# Patient Record
Sex: MALE | Race: WHITE | NOT HISPANIC OR LATINO | Employment: OTHER | URBAN - NONMETROPOLITAN AREA
[De-identification: names, ages, dates, MRNs, and addresses within clinical notes are randomized per-mention and may not be internally consistent; named-entity substitution may affect disease eponyms.]

---

## 2021-12-05 ENCOUNTER — HOSPITAL ENCOUNTER (EMERGENCY)
Facility: HOSPITAL | Age: 71
Discharge: HOME | End: 2021-12-05
Attending: EMERGENCY MEDICINE | Admitting: EMERGENCY MEDICINE

## 2021-12-05 VITALS
WEIGHT: 172 LBS | DIASTOLIC BLOOD PRESSURE: 97 MMHG | SYSTOLIC BLOOD PRESSURE: 186 MMHG | TEMPERATURE: 96.9 F | HEART RATE: 90 BPM | BODY MASS INDEX: 23.3 KG/M2 | HEIGHT: 72 IN | RESPIRATION RATE: 18 BRPM | OXYGEN SATURATION: 96 %

## 2021-12-05 DIAGNOSIS — B34.9 VIRAL ILLNESS: Primary | ICD-10-CM

## 2021-12-05 LAB
FLUAV SUBTYP SPEC NAA+PROBE: NOT DETECTED
FLUBV RNA ISLT QL NAA+PROBE: NOT DETECTED
SARS-COV-2 RNA PNL SPEC NAA+PROBE: DETECTED

## 2021-12-05 PROCEDURE — 99283 EMERGENCY DEPT VISIT LOW MDM: CPT

## 2021-12-05 PROCEDURE — C9803 HOPD COVID-19 SPEC COLLECT: HCPCS

## 2021-12-05 PROCEDURE — 87636 SARSCOV2 & INF A&B AMP PRB: CPT | Performed by: PHYSICIAN ASSISTANT

## 2021-12-05 NOTE — ED PROVIDER NOTES
Subjective   71 yo male patient presents to the ED for a COVID swab. Patient states that since last night he has had fever, HA, cough, congestion, and body aches. Was worried he has the flu or covid. Patient states that he just wanted to get a swab. No worsening or alleviating factors.  Pt has hx of HTN and hyperlipidemia.       History provided by:  Patient   used: No        Review of Systems   Constitutional: Positive for fever.   HENT: Positive for congestion.    Eyes: Negative.    Respiratory: Positive for cough.    Cardiovascular: Negative.    Gastrointestinal: Negative.    Endocrine: Negative.    Genitourinary: Negative.    Musculoskeletal: Positive for myalgias.   Skin: Negative.    Allergic/Immunologic: Negative.    Neurological: Positive for headaches.   Hematological: Negative.    Psychiatric/Behavioral: Negative.    All other systems reviewed and are negative.      Past Medical History:   Diagnosis Date   • Hyperlipidemia    • Hypertension    • Prostatic enlargement        No Known Allergies    Past Surgical History:   Procedure Laterality Date   • BACK SURGERY         No family history on file.    Social History     Socioeconomic History   • Marital status:    Tobacco Use   • Smoking status: Never Smoker   Substance and Sexual Activity   • Alcohol use: Yes     Comment: occasional   • Drug use: No           Objective   Physical Exam  Vitals and nursing note reviewed.   Constitutional:       Appearance: Normal appearance. He is normal weight.   HENT:      Head: Normocephalic and atraumatic.      Right Ear: External ear normal.      Left Ear: External ear normal.      Nose: Nose normal.      Mouth/Throat:      Mouth: Mucous membranes are moist.      Pharynx: Oropharynx is clear.   Eyes:      Extraocular Movements: Extraocular movements intact.      Conjunctiva/sclera: Conjunctivae normal.      Pupils: Pupils are equal, round, and reactive to light.   Cardiovascular:      Rate and  Rhythm: Normal rate and regular rhythm.      Pulses: Normal pulses.      Heart sounds: Normal heart sounds.   Pulmonary:      Effort: Pulmonary effort is normal.      Breath sounds: Normal breath sounds.   Abdominal:      General: Abdomen is flat. Bowel sounds are normal.      Palpations: Abdomen is soft.   Musculoskeletal:         General: Normal range of motion.      Cervical back: Normal range of motion and neck supple.   Skin:     General: Skin is warm and dry.      Capillary Refill: Capillary refill takes less than 2 seconds.   Neurological:      General: No focal deficit present.      Mental Status: He is alert and oriented to person, place, and time. Mental status is at baseline.   Psychiatric:         Mood and Affect: Mood normal.         Behavior: Behavior normal.         Thought Content: Thought content normal.         Judgment: Judgment normal.         Procedures           ED Course  ED Course as of 12/05/21 1640   Sun Dec 05, 2021   1636 PT does not wish to stay for results. States that he thought he was just going to be getting a covid swab and going home. Pt discharged. Instructions to f/u with PCP. Discussed sx that would warrant return.  [ML]      ED Course User Index  [ML] Tova Schulz PA                                                 [facility]    Final diagnoses:   Viral illness       ED Disposition  ED Disposition     ED Disposition Condition Comment    Discharge Stable           No follow-up provider specified.       Medication List      No changes were made to your prescriptions during this visit.          Tova Schulz PA  12/05/21 1640

## 2021-12-05 NOTE — DISCHARGE INSTRUCTIONS
Call one of the offices below to establish a primary care provider.  If you are unable to get an appointment and feel it is an emergency and need to be seen immediately please return to the Emergency Department.    Call one of the office below to set up a primary care provider.    Dr. Chris Longoria                                                                                                       [address] [facility] [address]  [phone]    Dr. Christian, Dr. DORINDA Laws, Dr. KIN Laws ([facility])  [address] [facility] [address]  [phone]    Dr. Wynn, Dr. Valverde, Dr. Combs ([facility])  [address] [facility] [address]  [phone]    Dr. Quiroz  [address] [facility] [address]  [phone]    Dr. Chavez, Dr. Shaw, Dr. Villegas, Dr. Dorado ([facility])  [address] [place] [address] [address]  [facility] [address]  [phone]    Dr. Blessing Olivo  [address] [facility] [place] [address]  [phone]    Dr. Adilia Delaney  [address]   [address]  [facility] [address]  [phone]    Dr. Longoria  [address] [facility] [address]  [phone]    Dr. Galeana, Dr. Pruett  [address] [facility] [place] [address]  [phone]    Dr. Aaron  [facility]                                                              [address]  [facility] [address]  [phone]    Dr. Alexander  [address] [facility] [address]  [phone]    Dr. Tanja Welsh  [address]  [address]  [place] [address]  [phone]    Dr. Santana and [facility]   [address] [facility]  [address]  [place] [address]  [phone]

## 2021-12-06 ENCOUNTER — TELEPHONE (OUTPATIENT)
Dept: EMERGENCY DEPT | Facility: HOSPITAL | Age: 71
End: 2021-12-06

## 2021-12-06 ENCOUNTER — HOSPITAL ENCOUNTER (OUTPATIENT)
Dept: INFUSION THERAPY | Facility: HOSPITAL | Age: 71
Discharge: HOME | End: 2021-12-06
Admitting: PHYSICIAN ASSISTANT

## 2021-12-06 VITALS
TEMPERATURE: 98.1 F | RESPIRATION RATE: 18 BRPM | DIASTOLIC BLOOD PRESSURE: 90 MMHG | SYSTOLIC BLOOD PRESSURE: 149 MMHG | OXYGEN SATURATION: 94 % | HEART RATE: 81 BPM

## 2021-12-06 DIAGNOSIS — U07.1 COVID-19: Primary | ICD-10-CM

## 2021-12-06 PROCEDURE — M0243 CASIRIVI AND IMDEVI INFUSION: HCPCS | Performed by: PHYSICIAN ASSISTANT

## 2021-12-06 PROCEDURE — 25010000002 INJECTION, CASIRIVIMAB AND IMDEVIMAB, 1200 MG: Performed by: PHYSICIAN ASSISTANT

## 2021-12-06 RX ORDER — EPINEPHRINE 1 MG/ML
0.3 INJECTION, SOLUTION INTRAMUSCULAR; SUBCUTANEOUS AS NEEDED
Status: CANCELLED | OUTPATIENT
Start: 2021-12-06

## 2021-12-06 RX ORDER — DIPHENHYDRAMINE HCL 50 MG
50 CAPSULE ORAL ONCE AS NEEDED
Status: CANCELLED | OUTPATIENT
Start: 2021-12-06

## 2021-12-06 RX ORDER — METHYLPREDNISOLONE SODIUM SUCCINATE 125 MG/2ML
125 INJECTION, POWDER, LYOPHILIZED, FOR SOLUTION INTRAMUSCULAR; INTRAVENOUS AS NEEDED
Status: CANCELLED | OUTPATIENT
Start: 2021-12-06

## 2021-12-06 RX ORDER — DIPHENHYDRAMINE HCL 50 MG
50 CAPSULE ORAL ONCE AS NEEDED
Status: DISCONTINUED | OUTPATIENT
Start: 2021-12-06 | End: 2021-12-08 | Stop reason: HOSPADM

## 2021-12-06 RX ORDER — DIPHENHYDRAMINE HYDROCHLORIDE 50 MG/ML
50 INJECTION INTRAMUSCULAR; INTRAVENOUS ONCE AS NEEDED
Status: CANCELLED | OUTPATIENT
Start: 2021-12-06

## 2021-12-06 RX ORDER — METHYLPREDNISOLONE SODIUM SUCCINATE 125 MG/2ML
125 INJECTION, POWDER, LYOPHILIZED, FOR SOLUTION INTRAMUSCULAR; INTRAVENOUS AS NEEDED
Status: DISCONTINUED | OUTPATIENT
Start: 2021-12-06 | End: 2021-12-08 | Stop reason: HOSPADM

## 2021-12-06 RX ORDER — DIPHENHYDRAMINE HYDROCHLORIDE 50 MG/ML
50 INJECTION INTRAMUSCULAR; INTRAVENOUS ONCE AS NEEDED
Status: DISCONTINUED | OUTPATIENT
Start: 2021-12-06 | End: 2021-12-08 | Stop reason: HOSPADM

## 2021-12-06 RX ORDER — EPINEPHRINE 1 MG/ML
0.3 INJECTION, SOLUTION INTRAMUSCULAR; SUBCUTANEOUS AS NEEDED
Status: DISCONTINUED | OUTPATIENT
Start: 2021-12-06 | End: 2021-12-08 | Stop reason: HOSPADM

## 2021-12-06 RX ADMIN — CASIRIVIMAB AND IMDEVIMAB: 600; 600 INJECTION, SOLUTION, CONCENTRATE INTRAVENOUS at 09:36

## 2021-12-06 NOTE — ED NOTES
Pt states that he just wanted to be tested for COVID 19 and called with the results. Phone # to call results at is [phone]. Provider made aware.      Karina Duke RN  12/06/21 0045

## 2022-07-30 ENCOUNTER — HOSPITAL ENCOUNTER (EMERGENCY)
Facility: HOSPITAL | Age: 72
Discharge: HOME | End: 2022-07-30
Attending: EMERGENCY MEDICINE | Admitting: EMERGENCY MEDICINE

## 2022-07-30 VITALS
HEIGHT: 72 IN | HEART RATE: 87 BPM | RESPIRATION RATE: 16 BRPM | OXYGEN SATURATION: 96 % | DIASTOLIC BLOOD PRESSURE: 86 MMHG | BODY MASS INDEX: 23.7 KG/M2 | WEIGHT: 175 LBS | SYSTOLIC BLOOD PRESSURE: 144 MMHG | TEMPERATURE: 97.7 F

## 2022-07-30 DIAGNOSIS — B34.9 VIRAL ILLNESS: Primary | ICD-10-CM

## 2022-07-30 LAB
FLUAV SUBTYP SPEC NAA+PROBE: NOT DETECTED
FLUBV RNA ISLT QL NAA+PROBE: NOT DETECTED
SARS-COV-2 RNA PNL SPEC NAA+PROBE: NOT DETECTED

## 2022-07-30 PROCEDURE — C9803 HOPD COVID-19 SPEC COLLECT: HCPCS

## 2022-07-30 PROCEDURE — 87636 SARSCOV2 & INF A&B AMP PRB: CPT | Performed by: PHYSICIAN ASSISTANT

## 2022-07-30 PROCEDURE — 99283 EMERGENCY DEPT VISIT LOW MDM: CPT

## 2023-06-15 ENCOUNTER — APPOINTMENT (OUTPATIENT)
Dept: GENERAL RADIOLOGY | Facility: HOSPITAL | Age: 73
End: 2023-06-15
Payer: OTHER GOVERNMENT

## 2023-06-15 ENCOUNTER — HOSPITAL ENCOUNTER (EMERGENCY)
Facility: HOSPITAL | Age: 73
Discharge: HOME | End: 2023-06-15
Attending: STUDENT IN AN ORGANIZED HEALTH CARE EDUCATION/TRAINING PROGRAM
Payer: OTHER GOVERNMENT

## 2023-06-15 VITALS
HEART RATE: 87 BPM | OXYGEN SATURATION: 95 % | DIASTOLIC BLOOD PRESSURE: 66 MMHG | BODY MASS INDEX: 23.7 KG/M2 | HEIGHT: 72 IN | TEMPERATURE: 97.5 F | SYSTOLIC BLOOD PRESSURE: 134 MMHG | RESPIRATION RATE: 16 BRPM | WEIGHT: 175 LBS

## 2023-06-15 DIAGNOSIS — G89.29 CHRONIC LEFT SHOULDER PAIN: Primary | ICD-10-CM

## 2023-06-15 DIAGNOSIS — M25.512 CHRONIC LEFT SHOULDER PAIN: Primary | ICD-10-CM

## 2023-06-15 LAB
ALBUMIN SERPL-MCNC: 4.2 G/DL (ref 3.5–5.2)
ALBUMIN/GLOB SERPL: 1.6 G/DL
ALP SERPL-CCNC: 99 U/L (ref 39–117)
ALT SERPL W P-5'-P-CCNC: 11 U/L (ref 1–41)
ANION GAP SERPL CALCULATED.3IONS-SCNC: 9.9 MMOL/L (ref 5–15)
AST SERPL-CCNC: 16 U/L (ref 1–40)
BASOPHILS # BLD AUTO: 0.06 10*3/MM3 (ref 0–0.2)
BASOPHILS NFR BLD AUTO: 0.8 % (ref 0–1.5)
BILIRUB SERPL-MCNC: 0.4 MG/DL (ref 0–1.2)
BILIRUB UR QL STRIP: NEGATIVE
BUN SERPL-MCNC: 16 MG/DL (ref 8–23)
BUN/CREAT SERPL: 16.3 (ref 7–25)
CALCIUM SPEC-SCNC: 9.3 MG/DL (ref 8.6–10.5)
CHLORIDE SERPL-SCNC: 103 MMOL/L (ref 98–107)
CLARITY UR: CLEAR
CO2 SERPL-SCNC: 26.1 MMOL/L (ref 22–29)
COLOR UR: YELLOW
CREAT SERPL-MCNC: 0.98 MG/DL (ref 0.76–1.27)
DEPRECATED RDW RBC AUTO: 48.1 FL (ref 37–54)
EGFRCR SERPLBLD CKD-EPI 2021: 80.9 ML/MIN/1.73
EOSINOPHIL # BLD AUTO: 0.36 10*3/MM3 (ref 0–0.4)
EOSINOPHIL NFR BLD AUTO: 5.1 % (ref 0.3–6.2)
ERYTHROCYTE [DISTWIDTH] IN BLOOD BY AUTOMATED COUNT: 13.4 % (ref 12.3–15.4)
GLOBULIN UR ELPH-MCNC: 2.6 GM/DL
GLUCOSE SERPL-MCNC: 103 MG/DL (ref 65–99)
GLUCOSE UR STRIP-MCNC: NEGATIVE MG/DL
HCT VFR BLD AUTO: 44.9 % (ref 37.5–51)
HGB BLD-MCNC: 14.6 G/DL (ref 13–17.7)
HGB UR QL STRIP.AUTO: NEGATIVE
HOLD SPECIMEN: NORMAL
HOLD SPECIMEN: NORMAL
IMM GRANULOCYTES # BLD AUTO: 0.03 10*3/MM3 (ref 0–0.05)
IMM GRANULOCYTES NFR BLD AUTO: 0.4 % (ref 0–0.5)
KETONES UR QL STRIP: ABNORMAL
LEUKOCYTE ESTERASE UR QL STRIP.AUTO: NEGATIVE
LYMPHOCYTES # BLD AUTO: 2.02 10*3/MM3 (ref 0.7–3.1)
LYMPHOCYTES NFR BLD AUTO: 28.5 % (ref 19.6–45.3)
MCH RBC QN AUTO: 31.7 PG (ref 26.6–33)
MCHC RBC AUTO-ENTMCNC: 32.5 G/DL (ref 31.5–35.7)
MCV RBC AUTO: 97.4 FL (ref 79–97)
MONOCYTES # BLD AUTO: 0.63 10*3/MM3 (ref 0.1–0.9)
MONOCYTES NFR BLD AUTO: 8.9 % (ref 5–12)
NEUTROPHILS NFR BLD AUTO: 3.99 10*3/MM3 (ref 1.7–7)
NEUTROPHILS NFR BLD AUTO: 56.3 % (ref 42.7–76)
NITRITE UR QL STRIP: NEGATIVE
NRBC BLD AUTO-RTO: 0 /100 WBC (ref 0–0.2)
PH UR STRIP.AUTO: 5.5 [PH] (ref 5–8)
PLATELET # BLD AUTO: 223 10*3/MM3 (ref 140–450)
PMV BLD AUTO: 9.8 FL (ref 6–12)
POTASSIUM SERPL-SCNC: 4.3 MMOL/L (ref 3.5–5.2)
PROT SERPL-MCNC: 6.8 G/DL (ref 6–8.5)
PROT UR QL STRIP: NEGATIVE
RBC # BLD AUTO: 4.61 10*6/MM3 (ref 4.14–5.8)
SODIUM SERPL-SCNC: 139 MMOL/L (ref 136–145)
SP GR UR STRIP: 1.02 (ref 1–1.03)
UROBILINOGEN UR QL STRIP: ABNORMAL
WBC NRBC COR # BLD: 7.09 10*3/MM3 (ref 3.4–10.8)
WHOLE BLOOD HOLD COAG: NORMAL
WHOLE BLOOD HOLD SPECIMEN: NORMAL

## 2023-06-15 PROCEDURE — 85025 COMPLETE CBC W/AUTO DIFF WBC: CPT | Performed by: PHYSICIAN ASSISTANT

## 2023-06-15 PROCEDURE — 73030 X-RAY EXAM OF SHOULDER: CPT

## 2023-06-15 PROCEDURE — 80053 COMPREHEN METABOLIC PANEL: CPT | Performed by: PHYSICIAN ASSISTANT

## 2023-06-15 PROCEDURE — 81003 URINALYSIS AUTO W/O SCOPE: CPT | Performed by: PHYSICIAN ASSISTANT

## 2023-06-15 NOTE — ED PROVIDER NOTES
Subjective   History of Present Illness  73 yo male patient with hx of HLD and HTN presents to the ED with complaints of left shoulder pain x 4 months. [facility] because he cannot drive it by himself. Pt states that they told him to come to the ED for basic labs to get a refill on his medications and shoulder xray. No new or recent injuries. No CP or SOB. No fevers.  No n/v/d. Pt reports worsening pain with movement, palpation, and stretching area.      History provided by:  Patient   used: No      Review of Systems   Constitutional: Negative.    HENT: Negative.     Eyes: Negative.    Respiratory: Negative.     Cardiovascular: Negative.    Gastrointestinal: Negative.    Endocrine: Negative.    Genitourinary: Negative.    Musculoskeletal:         +left shoulder pain    Skin: Negative.    Allergic/Immunologic: Negative.    Neurological: Negative.    Hematological: Negative.    Psychiatric/Behavioral: Negative.     All other systems reviewed and are negative.    Past Medical History:   Diagnosis Date    Hyperlipidemia     Hypertension     Prostatic enlargement        No Known Allergies    Past Surgical History:   Procedure Laterality Date    BACK SURGERY         No family history on file.    Social History     Socioeconomic History    Marital status:    Tobacco Use    Smoking status: Never   Substance and Sexual Activity    Alcohol use: Yes     Comment: occasional    Drug use: No           Objective   Physical Exam  Vitals and nursing note reviewed.   Constitutional:       Appearance: Normal appearance. He is normal weight.   HENT:      Head: Normocephalic and atraumatic.      Right Ear: External ear normal.      Left Ear: External ear normal.      Nose: Nose normal.      Mouth/Throat:      Mouth: Mucous membranes are moist.      Pharynx: Oropharynx is clear.   Eyes:      Extraocular Movements: Extraocular movements intact.       Conjunctiva/sclera: Conjunctivae normal.      Pupils: Pupils are equal, round, and reactive to light.   Cardiovascular:      Rate and Rhythm: Normal rate and regular rhythm.      Pulses: Normal pulses.      Heart sounds: Normal heart sounds.   Pulmonary:      Effort: Pulmonary effort is normal.      Breath sounds: Normal breath sounds.   Abdominal:      General: Abdomen is flat. Bowel sounds are normal.      Palpations: Abdomen is soft.   Musculoskeletal:         General: Normal range of motion.      Left shoulder: Tenderness present. Normal pulse.      Cervical back: Normal range of motion and neck supple.   Skin:     General: Skin is warm.      Capillary Refill: Capillary refill takes less than 2 seconds.   Neurological:      General: No focal deficit present.      Mental Status: He is alert and oriented to person, place, and time. Mental status is at baseline.   Psychiatric:         Mood and Affect: Mood normal.         Behavior: Behavior normal.         Thought Content: Thought content normal.         Judgment: Judgment normal.       Procedures           ED Course  ED Course as of 06/15/23 2155   Thu Umesh 15, 2023   1440 XR Shoulder 2+ View Left  MPRESSION:  1.  Chronic appearing mildly displaced fracture of the distal clavicle.  2.  Glenohumeral joint degenerative change.     This report was finalized on 6/15/2023 2:35 PM by Dr. Tiago Evans MD.           Specimen Collected: 06/15/23 14:34 EDT Last Resulted: 06/15/23 14:35 ED         [ML]      ED Course User Index  [ML] Tova Schulz PA                                           Medical Decision Making  73 yo male patient with hx of HLD and HTN presents to the ED with complaints of left shoulder pain x 4 months. [facility] because he cannot drive it by himself. Pt states that they told him to come to the ED for basic labs to get a refill on his medications and shoulder xray. No new or recent  injuries. No CP or SOB. No fevers.  No n/v/d. Pt reports worsening pain with movement, palpation, and stretching area.      Problems Addressed:  Chronic left shoulder pain: complicated acute illness or injury    Amount and/or Complexity of Data Reviewed  Labs: ordered.  Radiology: ordered. Decision-making details documented in ED Course.        Final diagnoses:   Chronic left shoulder pain       ED Disposition  ED Disposition       ED Disposition   Discharge    Condition   Stable    Comment   --               Kei Palumbo MD  [address] [place] [address] [address] [place] [address] [phone]    Schedule an appointment as soon as possible for a visit in 1 day           Medication List      No changes were made to your prescriptions during this visit.            Tova Schulz PA  06/15/23 4848

## 2023-07-18 ENCOUNTER — HOSPITAL ENCOUNTER (EMERGENCY)
Facility: HOSPITAL | Age: 73
Discharge: HOME | End: 2023-07-18
Attending: STUDENT IN AN ORGANIZED HEALTH CARE EDUCATION/TRAINING PROGRAM | Admitting: STUDENT IN AN ORGANIZED HEALTH CARE EDUCATION/TRAINING PROGRAM
Payer: OTHER GOVERNMENT

## 2023-07-18 ENCOUNTER — APPOINTMENT (OUTPATIENT)
Dept: CT IMAGING | Facility: HOSPITAL | Age: 73
End: 2023-07-18
Payer: OTHER GOVERNMENT

## 2023-07-18 VITALS
BODY MASS INDEX: 23.19 KG/M2 | OXYGEN SATURATION: 96 % | WEIGHT: 175 LBS | SYSTOLIC BLOOD PRESSURE: 160 MMHG | TEMPERATURE: 98.3 F | HEIGHT: 73 IN | DIASTOLIC BLOOD PRESSURE: 88 MMHG | RESPIRATION RATE: 18 BRPM | HEART RATE: 78 BPM

## 2023-07-18 DIAGNOSIS — R19.5 DARK STOOLS: ICD-10-CM

## 2023-07-18 DIAGNOSIS — K57.92 DIVERTICULITIS: Primary | ICD-10-CM

## 2023-07-18 DIAGNOSIS — N40.0 ENLARGED PROSTATE: ICD-10-CM

## 2023-07-18 LAB
ALBUMIN SERPL-MCNC: 4.6 G/DL (ref 3.5–5.2)
ALBUMIN/GLOB SERPL: 1.5 G/DL
ALP SERPL-CCNC: 98 U/L (ref 39–117)
ALT SERPL W P-5'-P-CCNC: 13 U/L (ref 1–41)
ANION GAP SERPL CALCULATED.3IONS-SCNC: 13.7 MMOL/L (ref 5–15)
APTT PPP: 32.3 SECONDS (ref 26.5–34.5)
AST SERPL-CCNC: 21 U/L (ref 1–40)
BASOPHILS # BLD AUTO: 0.08 10*3/MM3 (ref 0–0.2)
BASOPHILS NFR BLD AUTO: 1 % (ref 0–1.5)
BILIRUB SERPL-MCNC: 0.9 MG/DL (ref 0–1.2)
BILIRUB UR QL STRIP: ABNORMAL
BUN SERPL-MCNC: 20 MG/DL (ref 8–23)
BUN/CREAT SERPL: 23.5 (ref 7–25)
CALCIUM SPEC-SCNC: 9.7 MG/DL (ref 8.6–10.5)
CHLORIDE SERPL-SCNC: 101 MMOL/L (ref 98–107)
CLARITY UR: ABNORMAL
CO2 SERPL-SCNC: 24.3 MMOL/L (ref 22–29)
COLOR UR: ABNORMAL
CREAT SERPL-MCNC: 0.85 MG/DL (ref 0.76–1.27)
DEPRECATED RDW RBC AUTO: 46 FL (ref 37–54)
EGFRCR SERPLBLD CKD-EPI 2021: 91.2 ML/MIN/1.73
EOSINOPHIL # BLD AUTO: 0.67 10*3/MM3 (ref 0–0.4)
EOSINOPHIL NFR BLD AUTO: 8.6 % (ref 0.3–6.2)
ERYTHROCYTE [DISTWIDTH] IN BLOOD BY AUTOMATED COUNT: 13.2 % (ref 12.3–15.4)
GLOBULIN UR ELPH-MCNC: 3 GM/DL
GLUCOSE SERPL-MCNC: 110 MG/DL (ref 65–99)
GLUCOSE UR STRIP-MCNC: NEGATIVE MG/DL
HCT VFR BLD AUTO: 47.2 % (ref 37.5–51)
HGB BLD-MCNC: 15.5 G/DL (ref 13–17.7)
HGB UR QL STRIP.AUTO: NEGATIVE
IMM GRANULOCYTES # BLD AUTO: 0.02 10*3/MM3 (ref 0–0.05)
IMM GRANULOCYTES NFR BLD AUTO: 0.3 % (ref 0–0.5)
INR PPP: 1.06 (ref 0.9–1.1)
KETONES UR QL STRIP: ABNORMAL
LEUKOCYTE ESTERASE UR QL STRIP.AUTO: NEGATIVE
LYMPHOCYTES # BLD AUTO: 1.57 10*3/MM3 (ref 0.7–3.1)
LYMPHOCYTES NFR BLD AUTO: 20.1 % (ref 19.6–45.3)
MCH RBC QN AUTO: 31.2 PG (ref 26.6–33)
MCHC RBC AUTO-ENTMCNC: 32.8 G/DL (ref 31.5–35.7)
MCV RBC AUTO: 95 FL (ref 79–97)
MONOCYTES # BLD AUTO: 0.71 10*3/MM3 (ref 0.1–0.9)
MONOCYTES NFR BLD AUTO: 9.1 % (ref 5–12)
NEUTROPHILS NFR BLD AUTO: 4.78 10*3/MM3 (ref 1.7–7)
NEUTROPHILS NFR BLD AUTO: 60.9 % (ref 42.7–76)
NITRITE UR QL STRIP: NEGATIVE
NRBC BLD AUTO-RTO: 0 /100 WBC (ref 0–0.2)
PH UR STRIP.AUTO: 5.5 [PH] (ref 5–8)
PLATELET # BLD AUTO: 223 10*3/MM3 (ref 140–450)
PMV BLD AUTO: 9.7 FL (ref 6–12)
POTASSIUM SERPL-SCNC: 4 MMOL/L (ref 3.5–5.2)
PROT SERPL-MCNC: 7.6 G/DL (ref 6–8.5)
PROT UR QL STRIP: ABNORMAL
PROTHROMBIN TIME: 14.4 SECONDS (ref 12.1–14.7)
RBC # BLD AUTO: 4.97 10*6/MM3 (ref 4.14–5.8)
SODIUM SERPL-SCNC: 139 MMOL/L (ref 136–145)
SP GR UR STRIP: 1.02 (ref 1–1.03)
UROBILINOGEN UR QL STRIP: ABNORMAL
WBC NRBC COR # BLD: 7.83 10*3/MM3 (ref 3.4–10.8)

## 2023-07-18 PROCEDURE — 74177 CT ABD & PELVIS W/CONTRAST: CPT | Performed by: RADIOLOGY

## 2023-07-18 PROCEDURE — 80053 COMPREHEN METABOLIC PANEL: CPT | Performed by: STUDENT IN AN ORGANIZED HEALTH CARE EDUCATION/TRAINING PROGRAM

## 2023-07-18 PROCEDURE — 36415 COLL VENOUS BLD VENIPUNCTURE: CPT

## 2023-07-18 PROCEDURE — 81003 URINALYSIS AUTO W/O SCOPE: CPT | Performed by: STUDENT IN AN ORGANIZED HEALTH CARE EDUCATION/TRAINING PROGRAM

## 2023-07-18 PROCEDURE — 96374 THER/PROPH/DIAG INJ IV PUSH: CPT

## 2023-07-18 PROCEDURE — 85730 THROMBOPLASTIN TIME PARTIAL: CPT | Performed by: STUDENT IN AN ORGANIZED HEALTH CARE EDUCATION/TRAINING PROGRAM

## 2023-07-18 PROCEDURE — 85025 COMPLETE CBC W/AUTO DIFF WBC: CPT | Performed by: STUDENT IN AN ORGANIZED HEALTH CARE EDUCATION/TRAINING PROGRAM

## 2023-07-18 PROCEDURE — 25510000001 IOPAMIDOL 61 % SOLUTION: Performed by: STUDENT IN AN ORGANIZED HEALTH CARE EDUCATION/TRAINING PROGRAM

## 2023-07-18 PROCEDURE — 85610 PROTHROMBIN TIME: CPT | Performed by: STUDENT IN AN ORGANIZED HEALTH CARE EDUCATION/TRAINING PROGRAM

## 2023-07-18 PROCEDURE — 99283 EMERGENCY DEPT VISIT LOW MDM: CPT

## 2023-07-18 PROCEDURE — 74177 CT ABD & PELVIS W/CONTRAST: CPT

## 2023-07-18 RX ORDER — SODIUM CHLORIDE 0.9 % (FLUSH) 0.9 %
10 SYRINGE (ML) INJECTION AS NEEDED
Status: DISCONTINUED | OUTPATIENT
Start: 2023-07-18 | End: 2023-07-18 | Stop reason: HOSPADM

## 2023-07-18 RX ORDER — FAMOTIDINE 10 MG/ML
20 INJECTION, SOLUTION INTRAVENOUS ONCE
Status: COMPLETED | OUTPATIENT
Start: 2023-07-18 | End: 2023-07-18

## 2023-07-18 RX ORDER — AMOXICILLIN AND CLAVULANATE POTASSIUM 875; 125 MG/1; MG/1
1 TABLET, FILM COATED ORAL 2 TIMES DAILY
Qty: 20 TABLET | Refills: 0 | Status: SHIPPED | OUTPATIENT
Start: 2023-07-18 | End: 2023-07-28

## 2023-07-18 RX ADMIN — IOPAMIDOL 100 ML: 612 INJECTION, SOLUTION INTRAVENOUS at 08:59

## 2023-07-18 RX ADMIN — FAMOTIDINE 20 MG: 10 INJECTION, SOLUTION INTRAVENOUS at 07:59

## 2023-07-18 NOTE — ED PROVIDER NOTES
Subjective   History of Present Illness  72-year-old male presents to the ER with complaints of black stools.  Patient's symptoms have been present for the last couple days.  Patient has been consuming Kaopectate secondary to a diarrhea illness.  Patient also verbalizes he is having some urinary incontinence.  Patient's history is positive for prostate issues in which [facility].  Patient notes his last colonoscopy was about 5 years ago.  Patient has been on any type of anticoagulation or blood thinners.      Review of Systems   Constitutional: Negative.  Negative for fever.   HENT: Negative.     Respiratory: Negative.     Cardiovascular: Negative.  Negative for chest pain.   Gastrointestinal:  Positive for blood in stool. Negative for abdominal pain.   Endocrine: Negative.    Genitourinary:  Positive for difficulty urinating. Negative for dysuria.   Skin: Negative.    Neurological: Negative.    Psychiatric/Behavioral: Negative.     All other systems reviewed and are negative.    Past Medical History:   Diagnosis Date    Hyperlipidemia     Hypertension     Prostatic enlargement        No Known Allergies    Past Surgical History:   Procedure Laterality Date    BACK SURGERY         No family history on file.    Social History     Socioeconomic History    Marital status:    Tobacco Use    Smoking status: Never   Substance and Sexual Activity    Alcohol use: Yes     Comment: occasional    Drug use: No           Objective   Physical Exam  Vitals and nursing note reviewed.   Constitutional:       General: He is not in acute distress.     Appearance: He is well-developed. He is not diaphoretic.   HENT:      Head: Normocephalic and atraumatic.      Right Ear: External ear normal.      Left Ear: External ear normal.      Nose: Nose normal.   Eyes:      Conjunctiva/sclera: Conjunctivae normal.   Neck:      Vascular: No JVD.      Trachea: No tracheal deviation.   Cardiovascular:      Rate and Rhythm:  Normal rate.      Heart sounds: No murmur heard.  Pulmonary:      Effort: Pulmonary effort is normal. No respiratory distress.      Breath sounds: No wheezing.   Abdominal:      Palpations: Abdomen is soft.      Tenderness: There is abdominal tenderness.   Musculoskeletal:         General: No deformity. Normal range of motion.      Cervical back: Normal range of motion and neck supple.   Skin:     General: Skin is warm and dry.      Coloration: Skin is not pale.      Findings: No erythema or rash.   Neurological:      Mental Status: He is alert and oriented to person, place, and time.      Cranial Nerves: No cranial nerve deficit.   Psychiatric:         Behavior: Behavior normal.         Thought Content: Thought content normal.       Procedures           ED Course  ED Course as of 07/24/23 0650   Tue Jul 18, 2023   1006 Patient care was taken over from midlevel provider at shift change.  Patient had reported some dark stools without bright red blood.  Hemoglobin is stable at 15.5 hematocrit 47.2.  CT imaging shows thickening sigmoid with some inflammatory changes consistent with diverticulitis.    Patient does admit to tenderness on the left lower quadrant region which are consistent with CT findings.  Patient is agreeable to be treated with oral antibiotics.  He was encouraged to utilize MiraLAX for several days to help with stool burden and if he continues to have voiding difficulties to follow-up with urology.  Recommended that patient also follow-up with general surgery for colonoscopy once he completes antibiotic therapy.  Patient verbalizes understanding and return precautions discussed  Electronically signed by Suzette Paula DO, 07/18/23, 10:11 AM EDT.'   [LK]      ED Course User Index  [LK] Suzette Paula DO                                           Medical Decision Making  72-year-old male with chief complaint of black or bloody stools.  Patient also verbalizes that he is having difficulty  urinating.    Problems Addressed:  Dark stools: acute illness or injury  Diverticulitis: acute illness or injury     Details: Work-up is positive for diverticulitis.  Patient was endorsed to Dr. Paula at shift change to Dr. Paula was able to discharge this patient.  Patient treated appropriately for diverticulitis.  Outpatient follow-up for colonoscopy is recommended.  Enlarged prostate: acute illness or injury    Amount and/or Complexity of Data Reviewed  Labs: ordered.  Radiology: ordered. Decision-making details documented in ED Course.    Risk  Prescription drug management.        Final diagnoses:   Diverticulitis   Dark stools   Enlarged prostate       ED Disposition  ED Disposition       ED Disposition   Discharge    Condition   Stable    Comment   --               Shay Muir MD  [address] [facility]  [address]  [place] [address]  [phone]      Please call urology office to schedule follow-up appointment due to difficulty in voiding to be assessed for BPH.  CT imaging is consistent with enlarged prostate.    Felix Okeefe MD  [address] [facility]  [address]  [place] [address]  [phone]    Schedule an appointment as soon as possible for a visit   Call the office to schedule an appointment to have colonoscopy         Medication List        New Prescriptions      amoxicillin-clavulanate 875-125 MG per tablet  Commonly known as: AUGMENTIN  Take 1 tablet by mouth 2 (Two) Times a Day for 10 days. Diverticulitis               Where to Get Your Medications        These medications were sent to [id number] - [place] - [address] AT [facility] OF [address] - [phone] PH - [phone] FX  [address] [place] [address]      Phone: [phone]   amoxicillin-clavulanate 875-125 MG per tablet            Sven Duran II, PA  07/24/23 0061

## 2023-07-20 ENCOUNTER — TRANSCRIBE ORDERS (OUTPATIENT)
Dept: ADMINISTRATIVE | Facility: HOSPITAL | Age: 73
End: 2023-07-20
Payer: MEDICARE

## 2023-07-20 DIAGNOSIS — S49.92XA INJURY OF UPPER ARM, LEFT, INITIAL ENCOUNTER: Primary | ICD-10-CM

## 2023-07-25 ENCOUNTER — HOSPITAL ENCOUNTER (EMERGENCY)
Facility: HOSPITAL | Age: 73
Discharge: HOME | End: 2023-07-25
Attending: EMERGENCY MEDICINE | Admitting: EMERGENCY MEDICINE
Payer: OTHER GOVERNMENT

## 2023-07-25 VITALS
DIASTOLIC BLOOD PRESSURE: 92 MMHG | OXYGEN SATURATION: 97 % | WEIGHT: 175 LBS | BODY MASS INDEX: 23.7 KG/M2 | RESPIRATION RATE: 18 BRPM | TEMPERATURE: 97.6 F | HEART RATE: 81 BPM | HEIGHT: 72 IN | SYSTOLIC BLOOD PRESSURE: 137 MMHG

## 2023-07-25 DIAGNOSIS — K59.1 FUNCTIONAL DIARRHEA: Primary | ICD-10-CM

## 2023-07-25 LAB
ALBUMIN SERPL-MCNC: 5.1 G/DL (ref 3.5–5.2)
ALBUMIN/GLOB SERPL: 1.5 G/DL
ALP SERPL-CCNC: 104 U/L (ref 39–117)
ALT SERPL W P-5'-P-CCNC: 13 U/L (ref 1–41)
AMPHET+METHAMPHET UR QL: NEGATIVE
AMPHETAMINES UR QL: NEGATIVE
ANION GAP SERPL CALCULATED.3IONS-SCNC: 11.3 MMOL/L (ref 5–15)
AST SERPL-CCNC: 21 U/L (ref 1–40)
BARBITURATES UR QL SCN: NEGATIVE
BASOPHILS # BLD AUTO: 0.09 10*3/MM3 (ref 0–0.2)
BASOPHILS NFR BLD AUTO: 1 % (ref 0–1.5)
BENZODIAZ UR QL SCN: POSITIVE
BILIRUB SERPL-MCNC: 0.4 MG/DL (ref 0–1.2)
BILIRUB UR QL STRIP: NEGATIVE
BUN SERPL-MCNC: 17 MG/DL (ref 8–23)
BUN/CREAT SERPL: 17.3 (ref 7–25)
BUPRENORPHINE SERPL-MCNC: NEGATIVE NG/ML
CALCIUM SPEC-SCNC: 9.9 MG/DL (ref 8.6–10.5)
CANNABINOIDS SERPL QL: NEGATIVE
CHLORIDE SERPL-SCNC: 99 MMOL/L (ref 98–107)
CLARITY UR: CLEAR
CO2 SERPL-SCNC: 28.7 MMOL/L (ref 22–29)
COCAINE UR QL: NEGATIVE
COLOR UR: YELLOW
CREAT SERPL-MCNC: 0.98 MG/DL (ref 0.76–1.27)
D-LACTATE SERPL-SCNC: 1.5 MMOL/L (ref 0.5–2)
DEPRECATED RDW RBC AUTO: 47.4 FL (ref 37–54)
EGFRCR SERPLBLD CKD-EPI 2021: 80.9 ML/MIN/1.73
EOSINOPHIL # BLD AUTO: 0.56 10*3/MM3 (ref 0–0.4)
EOSINOPHIL NFR BLD AUTO: 6 % (ref 0.3–6.2)
ERYTHROCYTE [DISTWIDTH] IN BLOOD BY AUTOMATED COUNT: 13.3 % (ref 12.3–15.4)
FENTANYL UR-MCNC: NEGATIVE NG/ML
GLOBULIN UR ELPH-MCNC: 3.3 GM/DL
GLUCOSE SERPL-MCNC: 97 MG/DL (ref 65–99)
GLUCOSE UR STRIP-MCNC: NEGATIVE MG/DL
HCT VFR BLD AUTO: 51.2 % (ref 37.5–51)
HGB BLD-MCNC: 16.7 G/DL (ref 13–17.7)
HGB UR QL STRIP.AUTO: NEGATIVE
HOLD SPECIMEN: NORMAL
HOLD SPECIMEN: NORMAL
IMM GRANULOCYTES # BLD AUTO: 0.03 10*3/MM3 (ref 0–0.05)
IMM GRANULOCYTES NFR BLD AUTO: 0.3 % (ref 0–0.5)
KETONES UR QL STRIP: ABNORMAL
LEUKOCYTE ESTERASE UR QL STRIP.AUTO: NEGATIVE
LIPASE SERPL-CCNC: 34 U/L (ref 13–60)
LYMPHOCYTES # BLD AUTO: 2.77 10*3/MM3 (ref 0.7–3.1)
LYMPHOCYTES NFR BLD AUTO: 29.6 % (ref 19.6–45.3)
MCH RBC QN AUTO: 31.5 PG (ref 26.6–33)
MCHC RBC AUTO-ENTMCNC: 32.6 G/DL (ref 31.5–35.7)
MCV RBC AUTO: 96.6 FL (ref 79–97)
METHADONE UR QL SCN: NEGATIVE
MONOCYTES # BLD AUTO: 0.69 10*3/MM3 (ref 0.1–0.9)
MONOCYTES NFR BLD AUTO: 7.4 % (ref 5–12)
NEUTROPHILS NFR BLD AUTO: 5.21 10*3/MM3 (ref 1.7–7)
NEUTROPHILS NFR BLD AUTO: 55.7 % (ref 42.7–76)
NITRITE UR QL STRIP: NEGATIVE
NRBC BLD AUTO-RTO: 0 /100 WBC (ref 0–0.2)
OPIATES UR QL: POSITIVE
OXYCODONE UR QL SCN: NEGATIVE
PCP UR QL SCN: NEGATIVE
PH UR STRIP.AUTO: 5.5 [PH] (ref 5–8)
PLATELET # BLD AUTO: 310 10*3/MM3 (ref 140–450)
PMV BLD AUTO: 9.7 FL (ref 6–12)
POTASSIUM SERPL-SCNC: 4 MMOL/L (ref 3.5–5.2)
PROPOXYPH UR QL: NEGATIVE
PROT SERPL-MCNC: 8.4 G/DL (ref 6–8.5)
PROT UR QL STRIP: NEGATIVE
RBC # BLD AUTO: 5.3 10*6/MM3 (ref 4.14–5.8)
SODIUM SERPL-SCNC: 139 MMOL/L (ref 136–145)
SP GR UR STRIP: >1.03 (ref 1–1.03)
TRICYCLICS UR QL SCN: NEGATIVE
UROBILINOGEN UR QL STRIP: ABNORMAL
WBC NRBC COR # BLD: 9.35 10*3/MM3 (ref 3.4–10.8)
WHOLE BLOOD HOLD COAG: NORMAL
WHOLE BLOOD HOLD SPECIMEN: NORMAL

## 2023-07-25 PROCEDURE — 36415 COLL VENOUS BLD VENIPUNCTURE: CPT

## 2023-07-25 PROCEDURE — 85025 COMPLETE CBC W/AUTO DIFF WBC: CPT | Performed by: STUDENT IN AN ORGANIZED HEALTH CARE EDUCATION/TRAINING PROGRAM

## 2023-07-25 PROCEDURE — 80307 DRUG TEST PRSMV CHEM ANLYZR: CPT | Performed by: STUDENT IN AN ORGANIZED HEALTH CARE EDUCATION/TRAINING PROGRAM

## 2023-07-25 PROCEDURE — 99283 EMERGENCY DEPT VISIT LOW MDM: CPT

## 2023-07-25 PROCEDURE — 83690 ASSAY OF LIPASE: CPT | Performed by: STUDENT IN AN ORGANIZED HEALTH CARE EDUCATION/TRAINING PROGRAM

## 2023-07-25 PROCEDURE — 80053 COMPREHEN METABOLIC PANEL: CPT | Performed by: STUDENT IN AN ORGANIZED HEALTH CARE EDUCATION/TRAINING PROGRAM

## 2023-07-25 PROCEDURE — 81003 URINALYSIS AUTO W/O SCOPE: CPT | Performed by: STUDENT IN AN ORGANIZED HEALTH CARE EDUCATION/TRAINING PROGRAM

## 2023-07-25 PROCEDURE — 83605 ASSAY OF LACTIC ACID: CPT | Performed by: STUDENT IN AN ORGANIZED HEALTH CARE EDUCATION/TRAINING PROGRAM

## 2023-07-25 RX ORDER — DIPHENOXYLATE HYDROCHLORIDE AND ATROPINE SULFATE 2.5; .025 MG/1; MG/1
1 TABLET ORAL ONCE
Status: COMPLETED | OUTPATIENT
Start: 2023-07-25 | End: 2023-07-25

## 2023-07-25 RX ORDER — DIPHENOXYLATE HYDROCHLORIDE AND ATROPINE SULFATE 2.5; .025 MG/1; MG/1
1 TABLET ORAL 4 TIMES DAILY PRN
Qty: 6 TABLET | Refills: 0 | Status: SHIPPED | OUTPATIENT
Start: 2023-07-25

## 2023-07-25 RX ORDER — SODIUM CHLORIDE 0.9 % (FLUSH) 0.9 %
10 SYRINGE (ML) INJECTION AS NEEDED
Status: DISCONTINUED | OUTPATIENT
Start: 2023-07-25 | End: 2023-07-25 | Stop reason: HOSPADM

## 2023-07-25 RX ADMIN — DIPHENOXYLATE HYDROCHLORIDE AND ATROPINE SULFATE 1 TABLET: 2.5; .025 TABLET ORAL at 16:52

## 2023-07-25 NOTE — ED PROVIDER NOTES
Subjective   History of Present Illness  Patient presents to ER with diarrhea    Diarrhea  The primary symptoms include nausea and diarrhea.   The illness is also significant for chills and anorexia.     Review of Systems   Constitutional:  Positive for chills.   HENT: Negative.     Eyes: Negative.    Respiratory: Negative.     Gastrointestinal:  Positive for anorexia, diarrhea and nausea.   Endocrine: Negative.    Genitourinary:  Positive for frequency and urgency.   Musculoskeletal: Negative.    Neurological: Negative.    Hematological: Negative.    Psychiatric/Behavioral: Negative.       Past Medical History:   Diagnosis Date    Hyperlipidemia     Hypertension     Prostatic enlargement        No Known Allergies    Past Surgical History:   Procedure Laterality Date    BACK SURGERY         No family history on file.    Social History     Socioeconomic History    Marital status:    Tobacco Use    Smoking status: Never   Substance and Sexual Activity    Alcohol use: Yes     Comment: occasional    Drug use: No           Objective   Physical Exam  Vitals and nursing note reviewed.   HENT:      Head: Normocephalic.      Nose: Nose normal.      Mouth/Throat:      Mouth: Mucous membranes are moist.   Eyes:      Pupils: Pupils are equal, round, and reactive to light.   Cardiovascular:      Rate and Rhythm: Normal rate.      Pulses: Normal pulses.   Pulmonary:      Effort: Pulmonary effort is normal.   Abdominal:      General: Abdomen is flat.   Musculoskeletal:         General: Normal range of motion.      Cervical back: Normal range of motion.   Skin:     General: Skin is warm.      Capillary Refill: Capillary refill takes less than 2 seconds.   Neurological:      General: No focal deficit present.      Mental Status: He is alert.   Psychiatric:         Mood and Affect: Mood normal.       Procedures           ED Course                                           Medical Decision Making  Amount and/or Complexity of  Data Reviewed  Labs: ordered.    Risk  Prescription drug management.        Final diagnoses:   Functional diarrhea       ED Disposition  ED Disposition       ED Disposition   Discharge    Condition   Stable    Comment   --               Raquel Guo S, APRN  [address]  [facility] [address] [phone]    Schedule an appointment as soon as possible for a visit   If symptoms worsen         Medication List        New Prescriptions      diphenoxylate-atropine 2.5-0.025 MG per tablet  Commonly known as: LOMOTIL  Take 1 tablet by mouth 4 (Four) Times a Day As Needed for Diarrhea.               Where to Get Your Medications        These medications were sent to [id number] - [place], [place] - [address] AT [facility] [address] - [phone]  - [phone]   [address] [place] [address]      Phone: [phone]   diphenoxylate-atropine 2.5-0.025 MG per tablet            Jose Dooley MD  07/25/23 8623

## 2023-07-25 NOTE — ED NOTES
MEDICAL SCREENING:    Reason for Visit: Diarrhea, increased urinary difficulty, history of BPH    Patient initially seen in triage.  The patient was advised further evaluation and diagnostic testing will be needed, some of the treatment and testing will be initiated in the lobby in order to begin the process.  The patient will be returned to the waiting area for the time being and possibly be re-assessed by a subsequent ED provider.  The patient will be brought back to the treatment area in as timely manner as possible.       Eriberto Almaguer, DO  07/25/23 1450

## 2023-08-02 ENCOUNTER — OFFICE VISIT (OUTPATIENT)
Dept: UROLOGY | Facility: CLINIC | Age: 73
End: 2023-08-02
Payer: MEDICARE

## 2023-08-02 VITALS
BODY MASS INDEX: 23.03 KG/M2 | DIASTOLIC BLOOD PRESSURE: 65 MMHG | HEART RATE: 86 BPM | SYSTOLIC BLOOD PRESSURE: 113 MMHG | WEIGHT: 170 LBS | HEIGHT: 72 IN

## 2023-08-02 DIAGNOSIS — N40.0 ENLARGED PROSTATE: Primary | ICD-10-CM

## 2023-08-02 DIAGNOSIS — R35.0 BENIGN PROSTATIC HYPERPLASIA WITH URINARY FREQUENCY: ICD-10-CM

## 2023-08-02 DIAGNOSIS — N40.1 BENIGN PROSTATIC HYPERPLASIA WITH URINARY FREQUENCY: ICD-10-CM

## 2023-08-02 LAB
BILIRUB BLD-MCNC: NEGATIVE MG/DL
CLARITY, POC: CLEAR
COLOR UR: YELLOW
EXPIRATION DATE: NORMAL
GLUCOSE UR STRIP-MCNC: NEGATIVE MG/DL
KETONES UR QL: NEGATIVE
LEUKOCYTE EST, POC: NEGATIVE
Lab: NORMAL
NITRITE UR-MCNC: NEGATIVE MG/ML
PH UR: 6 [PH] (ref 5–8)
PROT UR STRIP-MCNC: NEGATIVE MG/DL
RBC # UR STRIP: NEGATIVE /UL
SP GR UR: 1.03 (ref 1–1.03)
UROBILINOGEN UR QL: NORMAL

## 2023-08-02 RX ORDER — FINASTERIDE 5 MG/1
5 TABLET, FILM COATED ORAL DAILY
Qty: 30 TABLET | Refills: 5 | Status: SHIPPED | OUTPATIENT
Start: 2023-08-02

## 2023-10-27 ENCOUNTER — OFFICE VISIT (OUTPATIENT)
Dept: UROLOGY | Facility: CLINIC | Age: 73
End: 2023-10-27
Payer: MEDICARE

## 2023-10-27 VITALS
SYSTOLIC BLOOD PRESSURE: 136 MMHG | HEART RATE: 93 BPM | DIASTOLIC BLOOD PRESSURE: 81 MMHG | WEIGHT: 165.6 LBS | BODY MASS INDEX: 22.43 KG/M2 | HEIGHT: 72 IN

## 2023-10-27 DIAGNOSIS — R35.0 FREQUENCY OF MICTURITION: Primary | ICD-10-CM

## 2023-10-27 DIAGNOSIS — N40.0 ENLARGED PROSTATE: ICD-10-CM

## 2023-10-27 DIAGNOSIS — R35.0 BENIGN PROSTATIC HYPERPLASIA WITH URINARY FREQUENCY: ICD-10-CM

## 2023-10-27 DIAGNOSIS — N40.1 BENIGN PROSTATIC HYPERPLASIA WITH URINARY FREQUENCY: ICD-10-CM

## 2023-10-27 DIAGNOSIS — R97.20 ELEVATED PROSTATE SPECIFIC ANTIGEN (PSA): ICD-10-CM

## 2023-10-27 LAB
BILIRUB BLD-MCNC: NEGATIVE MG/DL
CLARITY, POC: CLEAR
COLOR UR: YELLOW
EXPIRATION DATE: ABNORMAL
GLUCOSE UR STRIP-MCNC: NEGATIVE MG/DL
KETONES UR QL: NEGATIVE
LEUKOCYTE EST, POC: NEGATIVE
Lab: ABNORMAL
NITRITE UR-MCNC: NEGATIVE MG/ML
PH UR: 5 [PH] (ref 5–8)
PROT UR STRIP-MCNC: ABNORMAL MG/DL
RBC # UR STRIP: NEGATIVE /UL
SP GR UR: 1.02 (ref 1–1.03)
UROBILINOGEN UR QL: NORMAL

## 2023-10-27 PROCEDURE — 84153 ASSAY OF PSA TOTAL: CPT

## 2023-10-27 PROCEDURE — 84154 ASSAY OF PSA FREE: CPT

## 2023-10-27 NOTE — PROGRESS NOTES
"Chief Complaint:    Chief Complaint   Patient presents with    Elevated PSA       Vital Signs:   /81 (BP Location: Left arm, Patient Position: Sitting, Cuff Size: Adult)   Pulse 93   Ht 182.9 cm (72.01\")   Wt 75.1 kg (165 lb 9.6 oz)   BMI 22.45 kg/m²   Body mass index is 22.45 kg/m².      HPI:  Tip Palacios is a 72 y.o. male who presents today for follow up    History of Present Illness  Mr. Watts presents to the clinic for reevaluation of elevated PSA.  He has been referred to us by SETH Lal.  Patient does have a past medical history of elevated PSA and underwent a prostate biopsy that came back negative.  He was last seen in office by me and August of this year and was started on finasteride due to lower urinary tract symptoms.  He also currently takes Flomax nightly.  He still reports getting up roughly every 2 hours throughout the night.  He endorses frequency, urgency, weak urinary stream, intermittency, and straining to begin with urination.  [facility] that came back relatively high at 15.  They repeated a PSA on 9/22/2023 that came back slightly lower at roughly 10.  Digital rectal exam his prostate is enlarged with no tenderness to palpation.  There is significant hardness on the right side versus left.      Past Medical History:  Past Medical History:   Diagnosis Date    Hyperlipidemia     Hypertension     Prostatic enlargement        Current Meds:  Current Outpatient Medications   Medication Sig Dispense Refill    diphenoxylate-atropine (LOMOTIL) 2.5-0.025 MG per tablet Take 1 tablet by mouth 4 (Four) Times a Day As Needed for Diarrhea. 6 tablet 0    finasteride (PROSCAR) 5 MG tablet Take 1 tablet by mouth Daily. 30 tablet 5    gabapentin (NEURONTIN) 300 MG capsule Take 1 capsule by mouth 2 (Two) Times a Day.      HYDROcodone-acetaminophen (NORCO) 5-325 MG per tablet Take 1 tablet by mouth Every 8 (Eight) Hours As Needed for Severe Pain. 9 " tablet 0    pravastatin (PRAVACHOL) 40 MG tablet Take 1 tablet by mouth Daily.      tamsulosin (FLOMAX) 0.4 MG capsule 24 hr capsule Take 1 capsule by mouth Every Night.      traZODone (DESYREL) 50 MG tablet Take 1 tablet by mouth Every Night.      Mirabegron ER (MYRBETRIQ) 25 MG tablet sustained-release 24 hour 24 hr tablet Take 1 tablet by mouth Daily. 30 tablet 0     No current facility-administered medications for this visit.        Allergies:   No Known Allergies     Past Surgical History:  Past Surgical History:   Procedure Laterality Date    BACK SURGERY         Social History:  Social History     Socioeconomic History    Marital status:    Tobacco Use    Smoking status: Never    Smokeless tobacco: Never   Vaping Use    Vaping Use: Never used   Substance and Sexual Activity    Alcohol use: Yes     Comment: occasional    Drug use: No    Sexual activity: Never       Family History:  History reviewed. No pertinent family history.    Review of Systems:  Review of Systems   Constitutional:  Negative for chills, fatigue, fever and unexpected weight change.   Respiratory:  Negative for cough, chest tightness, shortness of breath and wheezing.    Cardiovascular:  Negative for chest pain and leg swelling.   Gastrointestinal:  Positive for diarrhea. Negative for abdominal pain, constipation, nausea and vomiting.   Genitourinary:  Positive for difficulty urinating, frequency and urgency. Negative for dysuria, flank pain, scrotal swelling and testicular pain.   Musculoskeletal:  Negative for back pain and joint swelling.   Skin:  Negative for rash.   Neurological:  Negative for dizziness and headaches.   Psychiatric/Behavioral:  Negative for confusion and suicidal ideas.        Physical Exam:  Physical Exam  Constitutional:       General: He is not in acute distress.     Appearance: Normal appearance.   HENT:      Head: Normocephalic and atraumatic.      Nose: Nose normal.      Mouth/Throat:      Mouth: Mucous  membranes are moist.   Eyes:      Conjunctiva/sclera: Conjunctivae normal.   Cardiovascular:      Rate and Rhythm: Normal rate and regular rhythm.      Pulses: Normal pulses.      Heart sounds: Normal heart sounds.   Pulmonary:      Effort: Pulmonary effort is normal.      Breath sounds: Normal breath sounds.   Abdominal:      General: Bowel sounds are normal.      Palpations: Abdomen is soft.   Genitourinary:     Comments: Enlarged prostate with significant hardness on right versus left.  Musculoskeletal:         General: Normal range of motion.      Cervical back: Normal range of motion.   Skin:     General: Skin is warm.   Neurological:      General: No focal deficit present.      Mental Status: He is alert and oriented to person, place, and time.   Psychiatric:         Mood and Affect: Mood normal.         Behavior: Behavior normal.         Thought Content: Thought content normal.         Judgment: Judgment normal.           Recent Image (CT and/or KUB):   CT Abdomen and Pelvis: No results found for this or any previous visit.     CT Stone Protocol: No results found for this or any previous visit.     KUB: No results found for this or any previous visit.       Labs:  Brief Urine Lab Results  (Last result in the past 365 days)        Color   Clarity   Blood   Leuk Est   Nitrite   Protein   CREAT   Urine HCG        10/27/23 1503 Yellow   Clear   Negative   Negative   Negative   Trace                 Office Visit on 10/27/2023   Component Date Value Ref Range Status    Color 10/27/2023 Yellow  Yellow, Straw, Dark Yellow, Sandra Final    Clarity, UA 10/27/2023 Clear  Clear Final    Specific Gravity  10/27/2023 1.025  1.005 - 1.030 Final    pH, Urine 10/27/2023 5.0  5.0 - 8.0 Final    Leukocytes 10/27/2023 Negative  Negative Final    Nitrite, UA 10/27/2023 Negative  Negative Final    Protein, POC 10/27/2023 Trace (A)  Negative mg/dL Final    Glucose, UA 10/27/2023 Negative  Negative mg/dL Final    Ketones, UA  10/27/2023 Negative  Negative Final    Urobilinogen, UA 10/27/2023 Normal  Normal, 0.2 E.U./dL Final    Bilirubin 10/27/2023 Negative  Negative Final    Blood, UA 10/27/2023 Negative  Negative Final    Lot Number 10/27/2023 [id number]   Final    Expiration Date 10/27/2023 10/5/24   Final   Office Visit on 08/02/2023   Component Date Value Ref Range Status    Color 08/02/2023 Yellow  Yellow, Straw, Dark Yellow, Sandra Final    Clarity, UA 08/02/2023 Clear  Clear Final    Specific Gravity  08/02/2023 1.030  1.005 - 1.030 Final    pH, Urine 08/02/2023 6.0  5.0 - 8.0 Final    Leukocytes 08/02/2023 Negative  Negative Final    Nitrite, UA 08/02/2023 Negative  Negative Final    Protein, POC 08/02/2023 Negative  Negative mg/dL Final    Glucose, UA 08/02/2023 Negative  Negative mg/dL Final    Ketones, UA 08/02/2023 Negative  Negative Final    Urobilinogen, UA 08/02/2023 Normal  Normal, 0.2 E.U./dL Final    Bilirubin 08/02/2023 Negative  Negative Final    Blood, UA 08/02/2023 Negative  Negative Final    Lot Number 08/02/2023 [id number]   Final    Expiration Date 08/02/2023 102,024   Final        Procedure: None  Procedures     I have reviewed and agree with the above PMH, PSH, FMH, social history, medications, allergies, and labs.     Assessment/Plan:   Problem List Items Addressed This Visit          Genitourinary and Reproductive     Enlarged prostate    Relevant Medications    Mirabegron ER (MYRBETRIQ) 25 MG tablet sustained-release 24 hour 24 hr tablet    Other Relevant Orders    PSA Total+% Free (Serial)    Benign prostatic hyperplasia with urinary frequency    Relevant Medications    Mirabegron ER (MYRBETRIQ) 25 MG tablet sustained-release 24 hour 24 hr tablet     Other Visit Diagnoses       Frequency of micturition    -  Primary    Relevant Orders    POC Urinalysis Dipstick, Automated (Completed)    Elevated prostate specific antigen (PSA)        Relevant Orders    PSA Total+% Free (Serial)            Health  Maintenance:   Health Maintenance Due   Topic Date Due    COLORECTAL CANCER SCREENING  Never done    COVID-19 Vaccine (1) Never done    TDAP/TD VACCINES (1 - Tdap) Never done    ZOSTER VACCINE (1 of 2) Never done    Pneumococcal Vaccine 65+ (1 - PCV) Never done    HEPATITIS C SCREENING  Never done    ANNUAL WELLNESS VISIT  Never done    LIPID PANEL  Never done    INFLUENZA VACCINE  Never done        Smoking Counseling: Never smoked or used smokeless tobacco    Urine Incontinence: Patient reports that he is not currently experiencing any symptoms of urinary incontinence.    Patient was given instructions and counseling regarding his condition or for health maintenance advice. Please see specific information pulled into the AVS if appropriate.    Patient Education:   Elevated PSA/prostatic enlargement - Discussed the pathophysiology of prostatic enlargement and obstruction.  We discussed the static and dynamic effects of prostatic enlargement as well as using 5 alpha reductase inhibitors versus alpha blockade.  We discussed the indications for transurethral surgery as well and/ or other therapeutic options available including all of the newer techniques.  Patient has been on Flomax and finasteride with minimal improvement in symptoms.  We will start him on trial of Myrbetriq 25 mg once nightly.  Discussed the risk and benefits of this medication.  Advised patient to discontinue Myrbetriq if he begins to experience an increase in difficulty urinating, decreased urinary output, concerns of urinary retention.  Patient verbalized understanding.  PSA testing -given patient's history of elevated PSA and recommended repeat PSA free and total.  I discussed the pathophysiology of PSA testing indicating its use in the diagnosis and management of prostate cancer.  I discussed the normal range being 0 to 4, but more appropriately being much closer to 0 to 2 in a normal male.  I discussed the fact that after a certain age it is  against recommendation to use PSA testing especially in view of numerous comorbidities.  I discussed many of the things that can artificially raise PSA including put not limited to a recent infection, urinary tract infection, recent sexual intercourse, or even the type of movement such as manipulation of the prostate from riding a bicycle.  It was discussed that the most important use of PSA is the velocity measurement.  This refers to the change of PSA with time. I discussed that we look for greater than 20% rise over a year to help us make the prediction of prostate cancer.  I also discussed that in the case of prostate cancer indicating a radical prostatectomy, the PSA should be 0 and any rise indicates an early biochemical recurrence.  I will call patient with PSA results once obtained.  Given digital rectal exam showing firmness to palpation on the right versus left side with some enlargement and recommending to schedule him for a biopsy as soon as possible.  I will call patient with PSA results on Monday with further discussion of biopsy at that time.  Patient verbalized understanding.    Visit Diagnoses:    ICD-10-CM ICD-9-CM   1. Frequency of micturition  R35.0 788.41   2. Enlarged prostate  N40.0 600.00   3. Elevated prostate specific antigen (PSA)  R97.20 790.93   4. Benign prostatic hyperplasia with urinary frequency  N40.1 600.01    R35.0 788.41       Meds Ordered During Visit:  New Medications Ordered This Visit   Medications    Mirabegron ER (MYRBETRIQ) 25 MG tablet sustained-release 24 hour 24 hr tablet     Sig: Take 1 tablet by mouth Daily.     Dispense:  30 tablet     Refill:  0       Follow Up Appointment: Biopsy  No follow-ups on file.      This document has been electronically signed by Yohannes Mcgowan PA-C   October 27, 2023 15:29 EDT    Part of this note may be an electronic transcription/translation of spoken language to printed text using the Dragon Dictation System.

## 2023-10-30 ENCOUNTER — TELEPHONE (OUTPATIENT)
Dept: UROLOGY | Facility: CLINIC | Age: 73
End: 2023-10-30
Payer: MEDICARE

## 2023-10-30 DIAGNOSIS — R97.20 ELEVATED PROSTATE SPECIFIC ANTIGEN (PSA): Primary | ICD-10-CM

## 2023-10-30 LAB
PSA FREE MFR SERPL: 12.1 %
PSA FREE SERPL-MCNC: 1.69 NG/ML
PSA SERPL-MCNC: 14 NG/ML (ref 0–4)

## 2023-10-30 RX ORDER — CLINDAMYCIN HYDROCHLORIDE 300 MG/1
300 CAPSULE ORAL 2 TIMES DAILY
Qty: 6 CAPSULE | Refills: 0 | Status: SHIPPED | OUTPATIENT
Start: 2023-10-30 | End: 2023-11-02

## 2023-10-30 RX ORDER — DIAZEPAM 10 MG/1
TABLET ORAL
Qty: 2 TABLET | Refills: 0 | Status: SHIPPED | OUTPATIENT
Start: 2023-10-30

## 2023-10-30 RX ORDER — CIPROFLOXACIN 500 MG/1
TABLET, FILM COATED ORAL
Qty: 4 TABLET | Refills: 0 | Status: SHIPPED | OUTPATIENT
Start: 2023-10-30

## 2023-10-30 NOTE — TELEPHONE ENCOUNTER
Called Mr. Monday to discuss PSA results.  Vies him it did come back significantly high at 14 with a 12% free percentage.  Advised him given digital rectal exam we should continue forward with a prostate biopsy.  I will send in appropriate medications and he should keep his follow-up on 21 December.  Patient verbalized understanding.

## 2023-11-13 ENCOUNTER — NURSE TRIAGE (OUTPATIENT)
Dept: CALL CENTER | Facility: HOSPITAL | Age: 73
End: 2023-11-13
Payer: MEDICARE

## 2023-11-13 ENCOUNTER — HOSPITAL ENCOUNTER (EMERGENCY)
Facility: HOSPITAL | Age: 73
Discharge: HOME | End: 2023-11-14
Attending: EMERGENCY MEDICINE
Payer: OTHER GOVERNMENT

## 2023-11-13 ENCOUNTER — APPOINTMENT (OUTPATIENT)
Dept: GENERAL RADIOLOGY | Facility: HOSPITAL | Age: 73
End: 2023-11-13
Payer: OTHER GOVERNMENT

## 2023-11-13 DIAGNOSIS — R40.4 TRANSIENT ALTERATION OF AWARENESS: ICD-10-CM

## 2023-11-13 DIAGNOSIS — I10 PRIMARY HYPERTENSION: ICD-10-CM

## 2023-11-13 DIAGNOSIS — F12.188 CANNABIS HYPEREMESIS SYNDROME CONCURRENT WITH AND DUE TO CANNABIS ABUSE: Primary | ICD-10-CM

## 2023-11-13 LAB
ALBUMIN SERPL-MCNC: 5.3 G/DL (ref 3.5–5.2)
ALBUMIN/GLOB SERPL: 1.7 G/DL
ALP SERPL-CCNC: 117 U/L (ref 39–117)
ALT SERPL W P-5'-P-CCNC: 13 U/L (ref 1–41)
ANION GAP SERPL CALCULATED.3IONS-SCNC: 24 MMOL/L (ref 5–15)
AST SERPL-CCNC: 23 U/L (ref 1–40)
BASOPHILS # BLD AUTO: 0.03 10*3/MM3 (ref 0–0.2)
BASOPHILS NFR BLD AUTO: 0.2 % (ref 0–1.5)
BILIRUB SERPL-MCNC: 0.7 MG/DL (ref 0–1.2)
BUN SERPL-MCNC: 15 MG/DL (ref 8–23)
BUN/CREAT SERPL: 14.4 (ref 7–25)
CALCIUM SPEC-SCNC: 10.6 MG/DL (ref 8.6–10.5)
CHLORIDE SERPL-SCNC: 96 MMOL/L (ref 98–107)
CO2 SERPL-SCNC: 18 MMOL/L (ref 22–29)
CREAT SERPL-MCNC: 1.04 MG/DL (ref 0.76–1.27)
DEPRECATED RDW RBC AUTO: 45 FL (ref 37–54)
EGFRCR SERPLBLD CKD-EPI 2021: 75.3 ML/MIN/1.73
EOSINOPHIL # BLD AUTO: 0 10*3/MM3 (ref 0–0.4)
EOSINOPHIL NFR BLD AUTO: 0 % (ref 0.3–6.2)
ERYTHROCYTE [DISTWIDTH] IN BLOOD BY AUTOMATED COUNT: 13.2 % (ref 12.3–15.4)
FLUAV RNA RESP QL NAA+PROBE: NOT DETECTED
FLUBV RNA RESP QL NAA+PROBE: NOT DETECTED
GLOBULIN UR ELPH-MCNC: 3.1 GM/DL
GLUCOSE SERPL-MCNC: 179 MG/DL (ref 65–99)
HCT VFR BLD AUTO: 59.1 % (ref 37.5–51)
HGB BLD-MCNC: 19.9 G/DL (ref 13–17.7)
HOLD SPECIMEN: NORMAL
HOLD SPECIMEN: NORMAL
IMM GRANULOCYTES # BLD AUTO: 0.05 10*3/MM3 (ref 0–0.05)
IMM GRANULOCYTES NFR BLD AUTO: 0.3 % (ref 0–0.5)
LYMPHOCYTES # BLD AUTO: 0.83 10*3/MM3 (ref 0.7–3.1)
LYMPHOCYTES NFR BLD AUTO: 4.9 % (ref 19.6–45.3)
MCH RBC QN AUTO: 31.4 PG (ref 26.6–33)
MCHC RBC AUTO-ENTMCNC: 33.7 G/DL (ref 31.5–35.7)
MCV RBC AUTO: 93.4 FL (ref 79–97)
MONOCYTES # BLD AUTO: 0.97 10*3/MM3 (ref 0.1–0.9)
MONOCYTES NFR BLD AUTO: 5.7 % (ref 5–12)
NEUTROPHILS NFR BLD AUTO: 15.08 10*3/MM3 (ref 1.7–7)
NEUTROPHILS NFR BLD AUTO: 88.9 % (ref 42.7–76)
NRBC BLD AUTO-RTO: 0 /100 WBC (ref 0–0.2)
PLATELET # BLD AUTO: 311 10*3/MM3 (ref 140–450)
PMV BLD AUTO: 9.7 FL (ref 6–12)
POTASSIUM SERPL-SCNC: 4.4 MMOL/L (ref 3.5–5.2)
PROT SERPL-MCNC: 8.4 G/DL (ref 6–8.5)
RBC # BLD AUTO: 6.33 10*6/MM3 (ref 4.14–5.8)
SARS-COV-2 RNA RESP QL NAA+PROBE: NOT DETECTED
SODIUM SERPL-SCNC: 138 MMOL/L (ref 136–145)
TROPONIN T SERPL HS-MCNC: 46 NG/L
WBC NRBC COR # BLD: 16.96 10*3/MM3 (ref 3.4–10.8)
WHOLE BLOOD HOLD COAG: NORMAL
WHOLE BLOOD HOLD SPECIMEN: NORMAL

## 2023-11-13 PROCEDURE — 84484 ASSAY OF TROPONIN QUANT: CPT | Performed by: EMERGENCY MEDICINE

## 2023-11-13 PROCEDURE — 85025 COMPLETE CBC W/AUTO DIFF WBC: CPT | Performed by: EMERGENCY MEDICINE

## 2023-11-13 PROCEDURE — 87636 SARSCOV2 & INF A&B AMP PRB: CPT | Performed by: EMERGENCY MEDICINE

## 2023-11-13 PROCEDURE — 93005 ELECTROCARDIOGRAM TRACING: CPT | Performed by: EMERGENCY MEDICINE

## 2023-11-13 PROCEDURE — 71045 X-RAY EXAM CHEST 1 VIEW: CPT

## 2023-11-13 PROCEDURE — 36415 COLL VENOUS BLD VENIPUNCTURE: CPT

## 2023-11-13 PROCEDURE — 80053 COMPREHEN METABOLIC PANEL: CPT | Performed by: EMERGENCY MEDICINE

## 2023-11-13 PROCEDURE — 99285 EMERGENCY DEPT VISIT HI MDM: CPT

## 2023-11-13 PROCEDURE — 71045 X-RAY EXAM CHEST 1 VIEW: CPT | Performed by: RADIOLOGY

## 2023-11-13 RX ORDER — SODIUM CHLORIDE 0.9 % (FLUSH) 0.9 %
10 SYRINGE (ML) INJECTION AS NEEDED
Status: DISCONTINUED | OUTPATIENT
Start: 2023-11-13 | End: 2023-11-14 | Stop reason: HOSPADM

## 2023-11-13 NOTE — TELEPHONE ENCOUNTER
"Reason for Disposition  • [1] MODERATE vomiting (e.g., 3 - 5 times/day) AND [2] age > 60 years    Additional Information  • Negative: Shock suspected (e.g., cold/pale/clammy skin, too weak to stand, low BP, rapid pulse)  • Negative: Difficult to awaken or acting confused (e.g., disoriented, slurred speech)  • Negative: Sounds like a life-threatening emergency to the triager  • Negative: Vomiting occurs only while coughing  • Negative: [1] Pregnant < 20 Weeks AND [2] nausea/vomiting began in early pregnancy (i.e., 4-8 weeks pregnant)  • Negative: Chest pain  • Negative: Headache is main symptom  • Negative: Vomiting (or Nausea) in a cancer patient who is currently (or recently) receiving chemotherapy or radiation therapy, or cancer patient who has metastatic or end-stage cancer and is receiving palliative care  • Negative: [1] Vomiting AND [2] contains red blood or black (\"coffee ground\") material  (Exception: Few red streaks in vomit that only happened once.)  • Negative: Severe pain in one eye  • Negative: Recent head injury (within last 3 days)  • Negative: Recent abdominal injury (within last 3 days)  • Negative: [1] Insulin-dependent diabetes (Type I) AND [2] glucose > 400 mg/dl (22 mmol/l)  • Negative: [1] Vomiting AND [2] hernia is more painful or swollen than usual  • Negative: [1] SEVERE vomiting (e.g., 6 or more times/day) AND [2] present > 8 hours (Exception: Patient sounds well, is drinking liquids, does not sound dehydrated, and vomiting has lasted less than 24 hours.)    Answer Assessment - Initial Assessment Questions  1. VOMITING SEVERITY: \"How many times have you vomited in the past 24 hours?\"      - MILD:  1 - 2 times/day     - MODERATE: 3 - 5 times/day, decreased oral intake without significant weight loss or symptoms of dehydration     - SEVERE: 6 or more times/day, vomits everything or nearly everything, with significant weight loss, symptoms of dehydration       Mod to severe  2. ONSET: \"When " "did the vomiting begin?\"       This morning  3. FLUIDS: \"What fluids or food have you vomited up today?\" \"Have you been able to keep any fluids down?\"      nothing  4. ABDOMEN PAIN: \"Are your having any abdomen pain?\" If Yes : \"How bad is it and what does it feel like?\" (e.g., crampy, dull, intermittent, constant)       Mild  5. DIARRHEA: \"Is there any diarrhea?\" If Yes, ask: \"How many times today?\"       no  6. CONTACTS: \"Is there anyone else in the family with the same symptoms?\"       no  7. CAUSE: \"What do you think is causing your vomiting?\"      Not sure  8. HYDRATION STATUS: \"Any signs of dehydration?\" (e.g., dry mouth [not only dry lips], too weak to stand) \"When did you last urinate?\"      no  9. OTHER SYMPTOMS: \"Do you have any other symptoms?\" (e.g., fever, headache, vertigo, vomiting blood or coffee grounds, recent head injury)      HTN  10. PREGNANCY: \"Is there any chance you are pregnant?\" \"When was your last menstrual period?\"        na    Protocols used: Vomiting-ADULT-AH    "

## 2023-11-14 ENCOUNTER — APPOINTMENT (OUTPATIENT)
Dept: CT IMAGING | Facility: HOSPITAL | Age: 73
End: 2023-11-14
Payer: OTHER GOVERNMENT

## 2023-11-14 VITALS
HEIGHT: 72 IN | WEIGHT: 165 LBS | DIASTOLIC BLOOD PRESSURE: 78 MMHG | SYSTOLIC BLOOD PRESSURE: 150 MMHG | OXYGEN SATURATION: 96 % | BODY MASS INDEX: 22.35 KG/M2 | TEMPERATURE: 98.2 F | HEART RATE: 96 BPM | RESPIRATION RATE: 20 BRPM

## 2023-11-14 LAB
A-A DO2: 21.8 MMHG (ref 0–300)
AMPHET+METHAMPHET UR QL: NEGATIVE
AMPHETAMINES UR QL: NEGATIVE
ARTERIAL PATENCY WRIST A: ABNORMAL
ATMOSPHERIC PRESS: 735 MMHG
BACTERIA UR QL AUTO: ABNORMAL /HPF
BARBITURATES UR QL SCN: NEGATIVE
BASE EXCESS BLDA CALC-SCNC: 3.7 MMOL/L (ref 0–2)
BDY SITE: ABNORMAL
BENZODIAZ UR QL SCN: NEGATIVE
BILIRUB UR QL STRIP: NEGATIVE
BUPRENORPHINE SERPL-MCNC: NEGATIVE NG/ML
CANNABINOIDS SERPL QL: POSITIVE
CLARITY UR: ABNORMAL
CO2 BLDA-SCNC: 24.3 MMOL/L (ref 22–33)
COCAINE UR QL: NEGATIVE
COHGB MFR BLD: 0.4 % (ref 0–5)
COLOR UR: YELLOW
FENTANYL UR-MCNC: NEGATIVE NG/ML
GEN 5 2HR TROPONIN T REFLEX: 44 NG/L
GLUCOSE UR STRIP-MCNC: ABNORMAL MG/DL
HCO3 BLDA-SCNC: 23.5 MMOL/L (ref 20–26)
HCT VFR BLD CALC: 60 % (ref 38–51)
HGB BLDA-MCNC: 19.6 G/DL (ref 14–18)
HGB UR QL STRIP.AUTO: ABNORMAL
HYALINE CASTS UR QL AUTO: ABNORMAL /LPF
INHALED O2 CONCENTRATION: 21 %
KETONES UR QL STRIP: ABNORMAL
LEUKOCYTE ESTERASE UR QL STRIP.AUTO: NEGATIVE
Lab: ABNORMAL
Lab: ABNORMAL
METHADONE UR QL SCN: NEGATIVE
METHGB BLD QL: <-0.1 % (ref 0–3)
MODALITY: ABNORMAL
NITRITE UR QL STRIP: NEGATIVE
NOTE: ABNORMAL
NOTIFIED BY: ABNORMAL
NOTIFIED WHO: ABNORMAL
OPIATES UR QL: NEGATIVE
OXYCODONE UR QL SCN: NEGATIVE
OXYHGB MFR BLDV: 97.9 % (ref 94–99)
PCO2 BLDA: 25 MM HG (ref 35–45)
PCO2 TEMP ADJ BLD: ABNORMAL MM[HG]
PCP UR QL SCN: NEGATIVE
PH BLDA: 7.58 PH UNITS (ref 7.35–7.45)
PH UR STRIP.AUTO: 5.5 [PH] (ref 5–8)
PH, TEMP CORRECTED: ABNORMAL
PO2 BLDA: 94.5 MM HG (ref 83–108)
PO2 TEMP ADJ BLD: ABNORMAL MM[HG]
PROT UR QL STRIP: ABNORMAL
QT INTERVAL: 346 MS
QTC INTERVAL: 465 MS
RBC # UR STRIP: ABNORMAL /HPF
REF LAB TEST METHOD: ABNORMAL
SAO2 % BLDCOA: 97.8 % (ref 94–99)
SP GR UR STRIP: >=1.03 (ref 1–1.03)
SQUAMOUS #/AREA URNS HPF: ABNORMAL /HPF
TRICYCLICS UR QL SCN: NEGATIVE
TROPONIN T DELTA: -2 NG/L
UROBILINOGEN UR QL STRIP: ABNORMAL
VENTILATOR MODE: ABNORMAL
WBC # UR STRIP: ABNORMAL /HPF

## 2023-11-14 PROCEDURE — 25010000002 ONDANSETRON PER 1 MG: Performed by: EMERGENCY MEDICINE

## 2023-11-14 PROCEDURE — 82375 ASSAY CARBOXYHB QUANT: CPT

## 2023-11-14 PROCEDURE — 25510000001 IOPAMIDOL 61 % SOLUTION: Performed by: EMERGENCY MEDICINE

## 2023-11-14 PROCEDURE — 25010000002 HYDRALAZINE PER 20 MG: Performed by: EMERGENCY MEDICINE

## 2023-11-14 PROCEDURE — 70450 CT HEAD/BRAIN W/O DYE: CPT | Performed by: RADIOLOGY

## 2023-11-14 PROCEDURE — 96375 TX/PRO/DX INJ NEW DRUG ADDON: CPT

## 2023-11-14 PROCEDURE — 81001 URINALYSIS AUTO W/SCOPE: CPT | Performed by: EMERGENCY MEDICINE

## 2023-11-14 PROCEDURE — 36600 WITHDRAWAL OF ARTERIAL BLOOD: CPT

## 2023-11-14 PROCEDURE — 25010000002 HALOPERIDOL LACTATE PER 5 MG: Performed by: EMERGENCY MEDICINE

## 2023-11-14 PROCEDURE — 25810000003 SODIUM CHLORIDE 0.9 % SOLUTION: Performed by: EMERGENCY MEDICINE

## 2023-11-14 PROCEDURE — 83050 HGB METHEMOGLOBIN QUAN: CPT

## 2023-11-14 PROCEDURE — 96374 THER/PROPH/DIAG INJ IV PUSH: CPT

## 2023-11-14 PROCEDURE — 84484 ASSAY OF TROPONIN QUANT: CPT | Performed by: EMERGENCY MEDICINE

## 2023-11-14 PROCEDURE — 74178 CT ABD&PLV WO CNTR FLWD CNTR: CPT

## 2023-11-14 PROCEDURE — 82805 BLOOD GASES W/O2 SATURATION: CPT

## 2023-11-14 PROCEDURE — 70450 CT HEAD/BRAIN W/O DYE: CPT

## 2023-11-14 PROCEDURE — 80307 DRUG TEST PRSMV CHEM ANLYZR: CPT | Performed by: EMERGENCY MEDICINE

## 2023-11-14 PROCEDURE — 74178 CT ABD&PLV WO CNTR FLWD CNTR: CPT | Performed by: RADIOLOGY

## 2023-11-14 RX ORDER — HALOPERIDOL 5 MG/ML
2 INJECTION INTRAMUSCULAR ONCE
Status: COMPLETED | OUTPATIENT
Start: 2023-11-14 | End: 2023-11-14

## 2023-11-14 RX ORDER — ONDANSETRON 2 MG/ML
4 INJECTION INTRAMUSCULAR; INTRAVENOUS ONCE
Status: COMPLETED | OUTPATIENT
Start: 2023-11-14 | End: 2023-11-14

## 2023-11-14 RX ORDER — HYDRALAZINE HYDROCHLORIDE 20 MG/ML
10 INJECTION INTRAMUSCULAR; INTRAVENOUS ONCE
Status: COMPLETED | OUTPATIENT
Start: 2023-11-14 | End: 2023-11-14

## 2023-11-14 RX ORDER — ONDANSETRON 2 MG/ML
4 INJECTION INTRAMUSCULAR; INTRAVENOUS ONCE
Status: DISCONTINUED | OUTPATIENT
Start: 2023-11-14 | End: 2023-11-14

## 2023-11-14 RX ADMIN — ONDANSETRON 4 MG: 2 INJECTION INTRAMUSCULAR; INTRAVENOUS at 05:28

## 2023-11-14 RX ADMIN — HYDRALAZINE HYDROCHLORIDE 10 MG: 20 INJECTION INTRAMUSCULAR; INTRAVENOUS at 01:42

## 2023-11-14 RX ADMIN — HALOPERIDOL LACTATE 2 MG: 5 INJECTION, SOLUTION INTRAMUSCULAR at 05:28

## 2023-11-14 RX ADMIN — IOPAMIDOL 70 ML: 612 INJECTION, SOLUTION INTRAVENOUS at 06:03

## 2023-11-14 RX ADMIN — SODIUM CHLORIDE 1000 ML: 9 INJECTION, SOLUTION INTRAVENOUS at 05:29

## 2023-11-14 NOTE — ED NOTES
Pt educated on the need for a urine sample. Pt provided with a urinal at this time. Pt stated he was unable to provide a sample at this time.

## 2023-11-14 NOTE — ED PROVIDER NOTES
"Subjective   History of Present Illness  Tip Palacios is a 72-year-old male sent to the emergency room by his wife who was concerned with his \"hands turning blue\" and the fact that his blood pressure was high, 200/115.    When I examined the patient in the emergency room he gave me a different story, stating that he \"had COVID 3 days ago\" and that he still felt nauseated and had occasional episodes of vomiting.  He denies any abdominal pain, fever, shortness of breath or cough.  Denies any recent travel, denies any recent exposure to sick contacts.        Review of Systems   Constitutional:  Negative for appetite change, chills, diaphoresis, fatigue and fever.        Concerned with blood pressure being high   HENT:  Negative for congestion, nosebleeds, rhinorrhea, sneezing and sore throat.    Eyes:  Negative for photophobia and discharge.   Respiratory:  Negative for cough, shortness of breath, wheezing and stridor.    Cardiovascular:  Negative for chest pain.   Gastrointestinal:  Positive for nausea and vomiting. Negative for abdominal pain, anal bleeding and diarrhea.   Endocrine: Negative for cold intolerance and heat intolerance.   Genitourinary:  Negative for decreased urine volume, difficulty urinating and dysuria.   Skin:  Negative for rash and wound.   Neurological:  Negative for dizziness, facial asymmetry, light-headedness and headaches.   Psychiatric/Behavioral:  Negative for agitation, self-injury, sleep disturbance and suicidal ideas.    All other systems reviewed and are negative.      Past Medical History:   Diagnosis Date    Hyperlipidemia     Hypertension     Prostatic enlargement        No Known Allergies    Past Surgical History:   Procedure Laterality Date    BACK SURGERY         No family history on file.    Social History     Socioeconomic History    Marital status:    Tobacco Use    Smoking status: Never    Smokeless tobacco: Never   Vaping Use    Vaping Use: Never used   Substance and " Sexual Activity    Alcohol use: Yes     Comment: occasional    Drug use: No    Sexual activity: Never           Objective   Physical Exam  Vitals and nursing note reviewed.   Constitutional:       General: He is not in acute distress.     Appearance: Normal appearance. He is normal weight. He is not ill-appearing, toxic-appearing or diaphoretic.   HENT:      Head: Normocephalic and atraumatic.      Nose: Nose normal.      Mouth/Throat:      Mouth: Mucous membranes are moist.      Pharynx: Oropharynx is clear.   Eyes:      Conjunctiva/sclera: Conjunctivae normal.      Pupils: Pupils are equal, round, and reactive to light.   Cardiovascular:      Rate and Rhythm: Normal rate and regular rhythm.      Pulses: Normal pulses.      Heart sounds: Normal heart sounds.   Pulmonary:      Effort: Pulmonary effort is normal.      Breath sounds: Normal breath sounds.   Abdominal:      General: Abdomen is flat. Bowel sounds are normal.   Musculoskeletal:         General: Normal range of motion.      Cervical back: Normal range of motion and neck supple.   Skin:     General: Skin is warm and dry.      Capillary Refill: Capillary refill takes 2 to 3 seconds.   Neurological:      General: No focal deficit present.      Mental Status: He is alert and oriented to person, place, and time. Mental status is at baseline.      GCS: GCS eye subscore is 4. GCS verbal subscore is 4. GCS motor subscore is 5.      Sensory: Sensation is intact.      Motor: Motor function is intact.      Coordination: Coordination is intact.   Psychiatric:         Mood and Affect: Mood normal. Mood is depressed. Affect is flat.         Speech: Speech is not delayed.         Behavior: Behavior normal. Behavior is withdrawn. Behavior is cooperative.         Thought Content: Thought content normal. Thought content does not include homicidal or suicidal ideation. Thought content does not include homicidal or suicidal plan.         Judgment: Judgment normal.  "        Procedures           ED Course  ED Course as of 11/14/23 1010   Mon Nov 13, 2023 2039 EKG interpretation:    Vent. Rate : 109 BPM     Atrial Rate : 109 BPM     P-R Int : 142 ms          QRS Dur :  88 ms      QT Int : 346 ms       P-R-T Axes :  58 -16  61 degrees     QTc Int : 465 ms     Sinus tachycardia  Biatrial enlargement  Abnormal ECG  No previous ECGs available   [DS]   Tue Nov 14, 2023   0430 Patient reevaluated, appears in no distress, medically stable.  Recommended he follows up with PCP in a couple of days.  Contrary to his believe the patient was COVID negative in the emergency room.  When questioned he told me that he obtained a home COVID test which was +3 days ago. [DS]   0510 When the patient's right (daughters boyfriend) arrived to  patient from the room we found patient stripped naked in his bed, in fetal position, with a large amount of vomitus on the floor.    Patient is now confused, discharge will be canceled and he will need to be reevaluated. [DS]   0520 Daughter's boyfriend states that patient has been recently experimenting with \"CBD Gummies that have been shipped and boxes to his house from [place]\", also abusing his pain pills [facility].  Daughter's boyfriend also believes that patient has been occasionally taking fentanyl as well. [DS]   0535 Patient managed to pull his IV out, will have another IV line inserted, will start him on fluids, Zofran/Haldol IVP X1 [DS]   0641 pH, Arterial(!!): 7.581 [DS]   0641 pCO2, Arterial(!): 25.0 [DS]   0641 Base Excess(!): 3.7 [DS]   0641 Hemoglobin, Blood Gas(!): 19.6 [DS]   0641 Hematocrit, Blood Gas(!): 60.0 [DS]   0641 Methemoglobin(!): <-0.10 [DS]   0641 WBC(!): 16.96 [DS]   0641 RBC(!): 6.33 [DS]   0641 Hemoglobin(!): 19.9 [DS]   0641 Hematocrit(!): 59.1 [DS]   0641 Neutrophil Rel %(!): 88.9 [DS]   0641 Lymphocyte Rel %(!): 4.9 [DS]   0641 Eosinophil Rel %(!): 0.0 [DS]   0641 Neutrophils Absolute(!): 15.08 " "[DS]   0641 Monocytes Absolute(!): 0.97 [DS]   0641 HS Troponin T(!): 44 [DS]   0641 Appearance, UA(!): Cloudy [DS]   0641 Glucose(!): 100 mg/dL (Trace) [DS]   0641 Ketones, UA(!): 40 mg/dL (2+) [DS]   0641 Blood, UA(!): Large (3+) [DS]   0641 Protein, UA(!): >=300 mg/dL (3+) [DS]   0641 HS Troponin T(!): 46 [DS]   0641 Glucose(!): 179 [DS]   0641 Chloride(!): 96 [DS]   0641 CO2(!): 18.0 [DS]   0641 Calcium(!): 10.6 [DS]   0641 Albumin(!): 5.3 [DS]   0641 Anion Gap(!): 24.0 [DS]   0641 RBC, UA(!): 11-20 [DS]   0641 Bacteria, UA(!): Trace [DS]   0641 Squamous Epithelial Cells, UA(!): 3-6 [DS]   0641 THC Screen, Urine(!): Positive [DS]   0644 Care turned over to Dr. Alamguer, pending CT scan head and CT scan abdomen/pelvis with IV contrast.  Anticipate patient to be discharged home with cannabis hyperemesis. [DS]   0645 Patient reevaluated, is asleep, in no distress, receiving IV fluids, without any further episodes of vomiting.  Advised patient to immediately discontinue the use of any CBD Gummies. [DS]      ED Course User Index  [DS] Jimmy Newman MD                                           Medical Decision Making  Tip Palacios is a 72-year-old male sent to the emergency room by his wife who was concerned with his \"hands turning blue\" and the fact that his blood pressure was high, 200/115.    When I examined the patient in the emergency room he gave me a different story, stating that he \"had COVID 3 days ago\" and that he still felt nauseated and had occasional episodes of vomiting.  He denies any abdominal pain, fever, shortness of breath or cough.  Denies any recent travel, denies any recent exposure to sick contacts.    I assumed care of this patient at shift change from Dr. Newman.  Laboratory work-up and imaging listed.  Altered mental status likely from cannabis overuse and intoxication.  Patient's mental status improved with IV fluids and rest.  No neurological deficits noted during my care.  This remained " stable.  Work up and results were discussed throughly with the patient.  The patient will be discharged for further monitoring and management with their PCP.  Red flags, warning signs, worsening symptoms, and when to return to the ER discussed with and understood by the patient.  Patient will follow up with their PCP in a timely manner.  Vitals stable at discharge.    Problems Addressed:  Cannabis hyperemesis syndrome concurrent with and due to cannabis abuse: complicated acute illness or injury  Primary hypertension: complicated acute illness or injury  Transient alteration of awareness: complicated acute illness or injury    Amount and/or Complexity of Data Reviewed  Labs: ordered. Decision-making details documented in ED Course.  Radiology: ordered.  ECG/medicine tests: ordered.    Risk  Prescription drug management.        Final diagnoses:   Primary hypertension   Transient alteration of awareness   Cannabis hyperemesis syndrome concurrent with and due to cannabis abuse       ED Disposition  ED Disposition       None            No follow-up provider specified.       Medication List      No changes were made to your prescriptions during this visit.            Eriberto Almaguer, DO  11/14/23 1010

## 2023-11-14 NOTE — ED NOTES
Attempted to contact patient's wife, Marianna Palacios, via telephone to discuss patient's plan for care and plans for hospital admission. Left message on answering machine.

## 2023-11-14 NOTE — ED NOTES
Spoke with patient's wife Marianna and advised her of patient's discharge and need for transportation home.

## 2023-11-14 NOTE — ED NOTES
Family member here to pick patient up. This nurse to room to remove IV and provide discharge instructions. Patient had removed IV and all clothing. Emesis noted in floor and patient cont to vomit. Provider to bedside to evaluate patient and speak with family member. Discharge plans cancelled at this time.

## 2023-11-14 NOTE — ED NOTES
Patient's wife called at this time to come and get patient and that patient is up for discharge, wife states that she will have someone come get her to come and pick him up.

## 2023-11-15 NOTE — PAYOR COMM NOTE
"Francesco Barnes (72 y.o. Male)       Date of Birth   1950    Social Security Number   [id number]    Address   [address] [place] [address]    Home Phone   [phone]    MRN   [id number]       [facility]    Marital Status                               Admission Date   11/13/23    Admission Type   Emergency    Admitting Provider       Attending Provider   Eriberto Almaguer DO    Department, Room/Bed   [facility] EMERGENCY DEPARTMENT, 115/15       Discharge Date   11/14/2023    Discharge Disposition   Home or Self Care    Discharge Destination   Other                              Attending Provider: Eriberto Almaguer DO    Allergies: No Known Allergies    Isolation: None   Infection: None   Code Status: Not on file    Ht: 182.9 cm (72\")   Wt: 74.8 kg (165 lb)    Admission Cmt: None   Principal Problem: None                  Active Insurance as of 11/13/2023       Primary Coverage       Payor Plan Insurance Group Employer/Plan Group    [facility] [id number]        Payor Plan Address Payor Plan Phone Number Payor Plan Fax Number Effective Dates    [facility] OFFICE OF [facility] CARE [phone]  10/19/2016 - None Entered    [address] [address]       [facility] [address]         Subscriber Name Subscriber Birth Date Member ID       FRANCESCO BARNES 1950 [id number]                     Emergency Contacts        (Rel.) Home Phone Work Phone Mobile Phone    Marianna Barnes (Spouse) [phone] -- --                 Discharge Summaryl        Rory Trotter RN at 11/14/23 1028          Patient's wife called at this time to come and get patient and that patient is up for discharge, wife states that she will have someone come get her to come and pick him up.    Electronically signed by Rory Trotter RN at 11/14/23 1029       Eriberto Almaguer DO at 11/14/23 1251          Subjective  History of Present Illness  Francesco Barnes is a 72-year-old male sent to the emergency " "room by his wife who was concerned with his \"hands turning blue\" and the fact that his blood pressure was high, 200/115.    When I examined the patient in the emergency room he gave me a different story, stating that he \"had COVID 3 days ago\" and that he still felt nauseated and had occasional episodes of vomiting.  He denies any abdominal pain, fever, shortness of breath or cough.  Denies any recent travel, denies any recent exposure to sick contacts.        Review of Systems   Constitutional:  Negative for appetite change, chills, diaphoresis, fatigue and fever.        Concerned with blood pressure being high   HENT:  Negative for congestion, nosebleeds, rhinorrhea, sneezing and sore throat.    Eyes:  Negative for photophobia and discharge.   Respiratory:  Negative for cough, shortness of breath, wheezing and stridor.    Cardiovascular:  Negative for chest pain.   Gastrointestinal:  Positive for nausea and vomiting. Negative for abdominal pain, anal bleeding and diarrhea.   Endocrine: Negative for cold intolerance and heat intolerance.   Genitourinary:  Negative for decreased urine volume, difficulty urinating and dysuria.   Skin:  Negative for rash and wound.   Neurological:  Negative for dizziness, facial asymmetry, light-headedness and headaches.   Psychiatric/Behavioral:  Negative for agitation, self-injury, sleep disturbance and suicidal ideas.    All other systems reviewed and are negative.      Past Medical History:   Diagnosis Date    Hyperlipidemia     Hypertension     Prostatic enlargement        No Known Allergies    Past Surgical History:   Procedure Laterality Date    BACK SURGERY         No family history on file.    Social History     Socioeconomic History    Marital status:    Tobacco Use    Smoking status: Never    Smokeless tobacco: Never   Vaping Use    Vaping Use: Never used   Substance and Sexual Activity    Alcohol use: Yes     Comment: occasional    Drug use: No    Sexual activity: " Never           Objective  Physical Exam  Vitals and nursing note reviewed.   Constitutional:       General: He is not in acute distress.     Appearance: Normal appearance. He is normal weight. He is not ill-appearing, toxic-appearing or diaphoretic.   HENT:      Head: Normocephalic and atraumatic.      Nose: Nose normal.      Mouth/Throat:      Mouth: Mucous membranes are moist.      Pharynx: Oropharynx is clear.   Eyes:      Conjunctiva/sclera: Conjunctivae normal.      Pupils: Pupils are equal, round, and reactive to light.   Cardiovascular:      Rate and Rhythm: Normal rate and regular rhythm.      Pulses: Normal pulses.      Heart sounds: Normal heart sounds.   Pulmonary:      Effort: Pulmonary effort is normal.      Breath sounds: Normal breath sounds.   Abdominal:      General: Abdomen is flat. Bowel sounds are normal.   Musculoskeletal:         General: Normal range of motion.      Cervical back: Normal range of motion and neck supple.   Skin:     General: Skin is warm and dry.      Capillary Refill: Capillary refill takes 2 to 3 seconds.   Neurological:      General: No focal deficit present.      Mental Status: He is alert and oriented to person, place, and time. Mental status is at baseline.      GCS: GCS eye subscore is 4. GCS verbal subscore is 4. GCS motor subscore is 5.      Sensory: Sensation is intact.      Motor: Motor function is intact.      Coordination: Coordination is intact.   Psychiatric:         Mood and Affect: Mood normal. Mood is depressed. Affect is flat.         Speech: Speech is not delayed.         Behavior: Behavior normal. Behavior is withdrawn. Behavior is cooperative.         Thought Content: Thought content normal. Thought content does not include homicidal or suicidal ideation. Thought content does not include homicidal or suicidal plan.         Judgment: Judgment normal.         Procedures           ED Course  ED Course as of 11/14/23 1010   Mon Nov 13, 202313, 2023 2039 EKG  "interpretation:    Vent. Rate : 109 BPM     Atrial Rate : 109 BPM     P-R Int : 142 ms          QRS Dur :  88 ms      QT Int : 346 ms       P-R-T Axes :  58 -16  61 degrees     QTc Int : 465 ms     Sinus tachycardia  Biatrial enlargement  Abnormal ECG  No previous ECGs available   [DS]   Tue Nov 14, 2023   0430 Patient reevaluated, appears in no distress, medically stable.  Recommended he follows up with PCP in a couple of days.  Contrary to his believe the patient was COVID negative in the emergency room.  When questioned he told me that he obtained a home COVID test which was +3 days ago. [DS]   0510 When the patient's right (daughters boyfriend) arrived to  patient from the room we found patient stripped naked in his bed, in fetal position, with a large amount of vomitus on the floor.    Patient is now confused, discharge will be canceled and he will need to be reevaluated. [DS]   0520 Daughter's boyfriend states that patient has been recently experimenting with \"CBD Gummies that have been shipped and boxes to his house from [place]\", also abusing his pain pills [facility].  Daughter's boyfriend also believes that patient has been occasionally taking fentanyl as well. [DS]   0535 Patient managed to pull his IV out, will have another IV line inserted, will start him on fluids, Zofran/Haldol IVP X1 [DS]   0641 pH, Arterial(!!): 7.581 [DS]   0641 pCO2, Arterial(!): 25.0 [DS]   0641 Base Excess(!): 3.7 [DS]   0641 Hemoglobin, Blood Gas(!): 19.6 [DS]   0641 Hematocrit, Blood Gas(!): 60.0 [DS]   0641 Methemoglobin(!): <-0.10 [DS]   0641 WBC(!): 16.96 [DS]   0641 RBC(!): 6.33 [DS]   0641 Hemoglobin(!): 19.9 [DS]   0641 Hematocrit(!): 59.1 [DS]   0641 Neutrophil Rel %(!): 88.9 [DS]   0641 Lymphocyte Rel %(!): 4.9 [DS]   0641 Eosinophil Rel %(!): 0.0 [DS]   0641 Neutrophils Absolute(!): 15.08 [DS]   0641 Monocytes Absolute(!): 0.97 [DS]   0641 HS Troponin T(!): 44 [DS]   0641 Appearance, UA(!): " "Cloudy [DS]   0641 Glucose(!): 100 mg/dL (Trace) [DS]   0641 Ketones, UA(!): 40 mg/dL (2+) [DS]   0641 Blood, UA(!): Large (3+) [DS]   0641 Protein, UA(!): >=300 mg/dL (3+) [DS]   0641 HS Troponin T(!): 46 [DS]   0641 Glucose(!): 179 [DS]   0641 Chloride(!): 96 [DS]   0641 CO2(!): 18.0 [DS]   0641 Calcium(!): 10.6 [DS]   0641 Albumin(!): 5.3 [DS]   0641 Anion Gap(!): 24.0 [DS]   0641 RBC, UA(!): 11-20 [DS]   0641 Bacteria, UA(!): Trace [DS]   0641 Squamous Epithelial Cells, UA(!): 3-6 [DS]   0641 THC Screen, Urine(!): Positive [DS]   0644 Care turned over to Dr. Almaguer, pending CT scan head and CT scan abdomen/pelvis with IV contrast.  Anticipate patient to be discharged home with cannabis hyperemesis. [DS]   0645 Patient reevaluated, is asleep, in no distress, receiving IV fluids, without any further episodes of vomiting.  Advised patient to immediately discontinue the use of any CBD Gummies. [DS]      ED Course User Index  [DS] Jimmy Newman MD                                           Medical Decision Making  Tip Palacios is a 72-year-old male sent to the emergency room by his wife who was concerned with his \"hands turning blue\" and the fact that his blood pressure was high, 200/115.    When I examined the patient in the emergency room he gave me a different story, stating that he \"had COVID 3 days ago\" and that he still felt nauseated and had occasional episodes of vomiting.  He denies any abdominal pain, fever, shortness of breath or cough.  Denies any recent travel, denies any recent exposure to sick contacts.    I assumed care of this patient at shift change from Dr. Newman.  Laboratory work-up and imaging listed.  Altered mental status likely from cannabis overuse and intoxication.  Patient's mental status improved with IV fluids and rest.  No neurological deficits noted during my care.  This remained stable.  Work up and results were discussed throughly with the patient.  The patient will be discharged " for further monitoring and management with their PCP.  Red flags, warning signs, worsening symptoms, and when to return to the ER discussed with and understood by the patient.  Patient will follow up with their PCP in a timely manner.  Vitals stable at discharge.    Problems Addressed:  Cannabis hyperemesis syndrome concurrent with and due to cannabis abuse: complicated acute illness or injury  Primary hypertension: complicated acute illness or injury  Transient alteration of awareness: complicated acute illness or injury    Amount and/or Complexity of Data Reviewed  Labs: ordered. Decision-making details documented in ED Course.  Radiology: ordered.  ECG/medicine tests: ordered.    Risk  Prescription drug management.        Final diagnoses:   Primary hypertension   Transient alteration of awareness   Cannabis hyperemesis syndrome concurrent with and due to cannabis abuse       ED Disposition  ED Disposition       None            No follow-up provider specified.       Medication List      No changes were made to your prescriptions during this visit.            Eriberto Almaguer DO  11/14/23 1010      Electronically signed by Eriberto Almaguer DO at 11/14/23 1010       Roxanna Delong, RN at 11/14/23 0510          Family member here to pick patient up. This nurse to room to remove IV and provide discharge instructions. Patient had removed IV and all clothing. Emesis noted in floor and patient cont to vomit. Provider to bedside to evaluate patient and speak with family member. Discharge plans cancelled at this time.    Electronically signed by Roxanna Delong RN at 11/14/23 0521       Roxanna Delong RN at 11/14/23 0413          Spoke with patient's wife Marianna and advised her of patient's discharge and need for transportation home.    Electronically signed by Roxanna Delong RN at 11/14/23 0414       Eriberto Almaguer DO at 11/14/23 0225    Electronically signed by Eriberto Almaguer DO at 11/14/23 1010       Roxanna Delong  RN at 11/14/23 0046          Attempted to contact patient's wife, Marianna Palacios, via telephone to discuss patient's plan for care and plans for hospital admission. Left message on answering machine.     Electronically signed by Roxanna Delong, TAVO at 11/14/23 0047       Tiffany Dominguez, RN at 11/13/23 8847          Pt educated on the need for a urine sample. Pt provided with a urinal at this time. Pt stated he was unable to provide a sample at this time.     Electronically signed by Tiffany Dominguez, RN at 11/13/23 9594

## 2023-12-14 ENCOUNTER — TELEPHONE (OUTPATIENT)
Dept: UROLOGY | Facility: CLINIC | Age: 73
End: 2023-12-14
Payer: MEDICARE

## 2023-12-14 ENCOUNTER — OFFICE VISIT (OUTPATIENT)
Dept: UROLOGY | Facility: CLINIC | Age: 73
End: 2023-12-14
Payer: OTHER GOVERNMENT

## 2023-12-14 VITALS
HEIGHT: 72 IN | SYSTOLIC BLOOD PRESSURE: 169 MMHG | DIASTOLIC BLOOD PRESSURE: 96 MMHG | BODY MASS INDEX: 21.62 KG/M2 | HEART RATE: 102 BPM | WEIGHT: 159.6 LBS

## 2023-12-14 DIAGNOSIS — N40.0 ENLARGED PROSTATE: ICD-10-CM

## 2023-12-14 DIAGNOSIS — N40.1 BENIGN PROSTATIC HYPERPLASIA WITH URINARY FREQUENCY: ICD-10-CM

## 2023-12-14 DIAGNOSIS — R35.0 BENIGN PROSTATIC HYPERPLASIA WITH URINARY FREQUENCY: ICD-10-CM

## 2023-12-14 DIAGNOSIS — R30.0 DYSURIA: Primary | ICD-10-CM

## 2023-12-14 DIAGNOSIS — R97.20 ELEVATED PSA, BETWEEN 10 AND LESS THAN 20 NG/ML: ICD-10-CM

## 2023-12-14 RX ORDER — FINASTERIDE 5 MG/1
5 TABLET, FILM COATED ORAL DAILY
Qty: 30 TABLET | Refills: 5 | Status: SHIPPED | OUTPATIENT
Start: 2023-12-14

## 2023-12-14 RX ORDER — PHENAZOPYRIDINE HYDROCHLORIDE 200 MG/1
200 TABLET, FILM COATED ORAL 3 TIMES DAILY PRN
Qty: 20 TABLET | Refills: 0 | Status: SHIPPED | OUTPATIENT
Start: 2023-12-14

## 2023-12-14 NOTE — TELEPHONE ENCOUNTER
The patient came in asking for a refill on his finasteride 5 mg tablet and samples of Myrbetriq 25mg.     Alexa Hopkins [address] in [place]

## 2023-12-14 NOTE — PROGRESS NOTES
"Chief Complaint:    Chief Complaint   Patient presents with    Dysuria       Vital Signs:   /96   Pulse 102   Ht 182.9 cm (72.01\")   Wt 72.4 kg (159 lb 9.6 oz)   BMI 21.64 kg/m²   Body mass index is 21.64 kg/m².      HPI:  Tip Palacios is a 72 y.o. male who presents today for follow up    History of Present Illness  Mr. Bartholomew presents to the clinic for concerns of dysuria.  He has a past medical history significant for prostatic enlargement and elevated PSA.  He was last seen in office was found to have a large prostate with hardness to palpation.  His PSA was elevated at roughly 14th at office visit with a 12% free percentage.  He was scheduled for a biopsy and states that he has had intermittent dysuria over the past few days.  He also endorses some frequency, urgency, chills, and diarrhea. He denies any gross hematuria, fever, back/flank pain, or nausea/vomiting.  He currently takes Flomax, finasteride, and Myrbetriq 25 mg.  UA today is completely unremarkable.  Bladder scan postvoid was 0 mL.  Patient does endorse a significant decrease in oral intake over the past few days.  Dysuria   Associated symptoms include chills and frequency. Pertinent negatives include no flank pain, hematuria, nausea, urgency or vomiting.       Past Medical History:  Past Medical History:   Diagnosis Date    Hyperlipidemia     Hypertension     Prostatic enlargement        Current Meds:  Current Outpatient Medications   Medication Sig Dispense Refill    diazePAM (VALIUM) 10 MG tablet One tablet night before procedure at bedtime and one morning of procedure 2 tablet 0    diphenoxylate-atropine (LOMOTIL) 2.5-0.025 MG per tablet Take 1 tablet by mouth 4 (Four) Times a Day As Needed for Diarrhea. 6 tablet 0    gabapentin (NEURONTIN) 300 MG capsule Take 1 capsule by mouth 2 (Two) Times a Day.      HYDROcodone-acetaminophen (NORCO) 5-325 MG per tablet Take 1 tablet by mouth Every 8 (Eight) Hours As Needed for Severe Pain. 9 " tablet 0    Mirabegron ER (MYRBETRIQ) 25 MG tablet sustained-release 24 hour 24 hr tablet Take 1 tablet by mouth Daily. 30 tablet 0    pravastatin (PRAVACHOL) 40 MG tablet Take 1 tablet by mouth Daily.      tamsulosin (FLOMAX) 0.4 MG capsule 24 hr capsule Take 1 capsule by mouth Every Night.      traZODone (DESYREL) 50 MG tablet Take 1 tablet by mouth Every Night.      ciprofloxacin (Cipro) 500 MG tablet Take one tablet twice a day before procedure (Patient not taking: Reported on 12/14/2023) 4 tablet 0    finasteride (PROSCAR) 5 MG tablet Take 1 tablet by mouth Daily. 30 tablet 5    phenazopyridine (Pyridium) 200 MG tablet Take 1 tablet by mouth 3 (Three) Times a Day As Needed for Bladder Spasms. 20 tablet 0     No current facility-administered medications for this visit.        Allergies:   No Known Allergies     Past Surgical History:  Past Surgical History:   Procedure Laterality Date    BACK SURGERY         Social History:  Social History     Socioeconomic History    Marital status:    Tobacco Use    Smoking status: Never    Smokeless tobacco: Never   Vaping Use    Vaping Use: Never used   Substance and Sexual Activity    Alcohol use: Yes     Comment: occasional    Drug use: No    Sexual activity: Never       Family History:  History reviewed. No pertinent family history.    Review of Systems:  Review of Systems   Constitutional:  Positive for chills and diaphoresis. Negative for fatigue, fever and unexpected weight change.   Respiratory:  Negative for chest tightness and shortness of breath.    Cardiovascular:  Negative for chest pain.   Gastrointestinal:  Positive for diarrhea. Negative for abdominal pain, constipation, nausea and vomiting.   Genitourinary:  Positive for difficulty urinating, dysuria and frequency. Negative for flank pain, hematuria and urgency.   Skin:  Negative for rash.   Psychiatric/Behavioral:  Negative for confusion and suicidal ideas.        Physical Exam:  Physical  Exam  Constitutional:       General: He is not in acute distress.     Appearance: Normal appearance.   HENT:      Head: Normocephalic.      Mouth/Throat:      Mouth: Mucous membranes are moist.      Pharynx: Oropharynx is clear.   Eyes:      Extraocular Movements: Extraocular movements intact.      Conjunctiva/sclera: Conjunctivae normal.   Cardiovascular:      Rate and Rhythm: Normal rate and regular rhythm.      Pulses: Normal pulses.      Heart sounds: Normal heart sounds.   Pulmonary:      Effort: Pulmonary effort is normal.      Breath sounds: Normal breath sounds.   Abdominal:      General: Abdomen is flat. Bowel sounds are normal.      Palpations: Abdomen is soft.   Musculoskeletal:      Cervical back: Normal range of motion.   Skin:     General: Skin is warm.   Neurological:      General: No focal deficit present.      Mental Status: He is alert and oriented to person, place, and time.   Psychiatric:         Mood and Affect: Mood normal.         Thought Content: Thought content normal.         Judgment: Judgment normal.           Recent Image (CT and/or KUB):   CT Abdomen and Pelvis: Results for orders placed during the hospital encounter of 11/13/23    CT Abdomen Pelvis With & Without Contrast    Narrative  EXAM: CT ABDOMEN PELVIS W WO CONTRAST-      TECHNIQUE: Multiple axial CT images were obtained from lung bases  through pubic symphysis WITHOUT AND THEN AFTER THE administration of IV  contrast. Reformatted images in the coronal and/or sagittal plane(s)  were generated from the axial data set to facilitate diagnostic accuracy  and/or surgical planning.  Oral Contrast:NONE.    Radiation dose reduction techniques were utilized per ALARA protocol.  Automated exposure control was initiated through either or CareDose or  DoseRigTradoria software packages by  protocol.  DOSE:    Clinical information Abdominal pain, acute, nonlocalized; I10-Essential  (primary) hypertension; B34.9-Viral infection,  unspecified    Comparison 7/18/2023    FINDINGS:    Lower thorax: Clear. No effusions.    Abdomen:    Liver: Homogeneous. No focal hepatic mass or ductal dilatation.    Gallbladder: No dilation or stone identified.    Pancreas: Prominence of the pancreatic duct. Patient motion accentuates  what may be peripancreatic stranding but I favor this being artifact.    Spleen: Homogeneous. No splenomegaly.    Adrenals: No mass.    Kidneys/ureters: No mass. No obstructive uropathy.  No evidence of  urolithiasis.    GI tract: Sigmoid colon is thick-walled and there are diverticuli  without diverticulitis. There is no evidence of appendicitis    MESENTERY: No free fluid, walled off fluid collections, mesenteric  stranding, or enlarged lymph nodes      Vasculature: Evidence of atherosclerotic vascular disease    Abdominal wall: No focal hernia or mass.      Bladder: Abnormal urinary bladder wall thickening    Reproductive: Prostate enlargement    Bones: Grade 1 anterior listhesis of L5 on S1    Impression  1. Mild dilatation of the pancreatic duct and cannot exclude  peripancreatic stranding but I favor this representing artifact due to  extensive respiratory motion during the exam    2. Sigmoid diverticuli without diverticulitis. Also mild thickening of  the sigmoid colon wall. Consider direct visualization    3. Urinary bladder wall thickening    4. Other findings as above                  This report was finalized on 11/14/2023 9:29 AM by Dr. Zachary Diallo MD.     CT Stone Protocol: No results found for this or any previous visit.     KUB: No results found for this or any previous visit.       Labs:  Brief Urine Lab Results  (Last result in the past 365 days)        Color   Clarity   Blood   Leuk Est   Nitrite   Protein   CREAT   Urine HCG        11/14/23 0002 Yellow   Cloudy   Large (3+)   Negative   Negative   >=300 mg/dL (3+)                 Admission on 11/13/2023, Discharged on 11/14/2023   Component Date Value Ref  Range Status    QT Interval 11/13/2023 346  ms Final    QTC Interval 11/13/2023 465  ms Final    Glucose 11/13/2023 179 (H)  65 - 99 mg/dL Final    BUN 11/13/2023 15  8 - 23 mg/dL Final    Creatinine 11/13/2023 1.04  0.76 - 1.27 mg/dL Final    Sodium 11/13/2023 138  136 - 145 mmol/L Final    Potassium 11/13/2023 4.4  3.5 - 5.2 mmol/L Final    Slight hemolysis detected by analyzer. Result may be falsely elevated.    Chloride 11/13/2023 96 (L)  98 - 107 mmol/L Final    CO2 11/13/2023 18.0 (L)  22.0 - 29.0 mmol/L Final    Calcium 11/13/2023 10.6 (H)  8.6 - 10.5 mg/dL Final    Total Protein 11/13/2023 8.4  6.0 - 8.5 g/dL Final    Albumin 11/13/2023 5.3 (H)  3.5 - 5.2 g/dL Final    ALT (SGPT) 11/13/2023 13  1 - 41 U/L Final    AST (SGOT) 11/13/2023 23  1 - 40 U/L Final    Alkaline Phosphatase 11/13/2023 117  39 - 117 U/L Final    Total Bilirubin 11/13/2023 0.7  0.0 - 1.2 mg/dL Final    Globulin 11/13/2023 3.1  gm/dL Final    A/G Ratio 11/13/2023 1.7  g/dL Final    BUN/Creatinine Ratio 11/13/2023 14.4  7.0 - 25.0 Final    Anion Gap 11/13/2023 24.0 (H)  5.0 - 15.0 mmol/L Final    eGFR 11/13/2023 75.3  >60.0 mL/min/1.73 Final    HS Troponin T 11/13/2023 46 (H)  <22 ng/L Final    Extra Tube 11/13/2023 Hold for add-ons.   Final    Auto resulted.    Extra Tube 11/13/2023 hold for add-on   Final    Auto resulted    Extra Tube 11/13/2023 Hold for add-ons.   Final    Auto resulted.    Extra Tube 11/13/2023 Hold for add-ons.   Final    Auto resulted    WBC 11/13/2023 16.96 (H)  3.40 - 10.80 10*3/mm3 Final    RBC 11/13/2023 6.33 (H)  4.14 - 5.80 10*6/mm3 Final    Hemoglobin 11/13/2023 19.9 (H)  13.0 - 17.7 g/dL Final    Hematocrit 11/13/2023 59.1 (H)  37.5 - 51.0 % Final    MCV 11/13/2023 93.4  79.0 - 97.0 fL Final    MCH 11/13/2023 31.4  26.6 - 33.0 pg Final    MCHC 11/13/2023 33.7  31.5 - 35.7 g/dL Final    RDW 11/13/2023 13.2  12.3 - 15.4 % Final    RDW-SD 11/13/2023 45.0  37.0 - 54.0 fl Final    MPV 11/13/2023 9.7  6.0 - 12.0  fL Final    Platelets 11/13/2023 311  140 - 450 10*3/mm3 Final    Neutrophil % 11/13/2023 88.9 (H)  42.7 - 76.0 % Final    Lymphocyte % 11/13/2023 4.9 (L)  19.6 - 45.3 % Final    Monocyte % 11/13/2023 5.7  5.0 - 12.0 % Final    Eosinophil % 11/13/2023 0.0 (L)  0.3 - 6.2 % Final    Basophil % 11/13/2023 0.2  0.0 - 1.5 % Final    Immature Grans % 11/13/2023 0.3  0.0 - 0.5 % Final    Neutrophils, Absolute 11/13/2023 15.08 (H)  1.70 - 7.00 10*3/mm3 Final    Lymphocytes, Absolute 11/13/2023 0.83  0.70 - 3.10 10*3/mm3 Final    Monocytes, Absolute 11/13/2023 0.97 (H)  0.10 - 0.90 10*3/mm3 Final    Eosinophils, Absolute 11/13/2023 0.00  0.00 - 0.40 10*3/mm3 Final    Basophils, Absolute 11/13/2023 0.03  0.00 - 0.20 10*3/mm3 Final    Immature Grans, Absolute 11/13/2023 0.05  0.00 - 0.05 10*3/mm3 Final    nRBC 11/13/2023 0.0  0.0 - 0.2 /100 WBC Final    COVID19 11/13/2023 Not Detected  Not Detected - Ref. Range Final    Influenza A PCR 11/13/2023 Not Detected  Not Detected Final    Influenza B PCR 11/13/2023 Not Detected  Not Detected Final    THC, Screen, Urine 11/14/2023 Positive (A)  Negative Final    Phencyclidine (PCP), Urine 11/14/2023 Negative  Negative Final    Cocaine Screen, Urine 11/14/2023 Negative  Negative Final    Methamphetamine, Ur 11/14/2023 Negative  Negative Final    Opiate Screen 11/14/2023 Negative  Negative Final    Amphetamine Screen, Urine 11/14/2023 Negative  Negative Final    Benzodiazepine Screen, Urine 11/14/2023 Negative  Negative Final    Tricyclic Antidepressants Screen 11/14/2023 Negative  Negative Final    Methadone Screen, Urine 11/14/2023 Negative  Negative Final    Barbiturates Screen, Urine 11/14/2023 Negative  Negative Final    Oxycodone Screen, Urine 11/14/2023 Negative  Negative Final    Buprenorphine, Screen, Urine 11/14/2023 Negative  Negative Final    Color, UA 11/14/2023 Yellow  Yellow, Straw Final    Appearance, UA 11/14/2023 Cloudy (A)  Clear Final    pH, UA 11/14/2023 5.5  5.0 -  8.0 Final    Specific Gravity, UA 11/14/2023 >=1.030  1.005 - 1.030 Final    Glucose, UA 11/14/2023 100 mg/dL (Trace) (A)  Negative Final    Ketones, UA 11/14/2023 40 mg/dL (2+) (A)  Negative Final    Bilirubin, UA 11/14/2023 Negative  Negative Final    Blood, UA 11/14/2023 Large (3+) (A)  Negative Final    Protein, UA 11/14/2023 >=300 mg/dL (3+) (A)  Negative Final    Leuk Esterase, UA 11/14/2023 Negative  Negative Final    Nitrite, UA 11/14/2023 Negative  Negative Final    Urobilinogen, UA 11/14/2023 0.2 E.U./dL  0.2 - 1.0 E.U./dL Final    HS Troponin T 11/14/2023 44 (H)  <22 ng/L Final    Troponin T Delta 11/14/2023 -2  >=-4 - <+4 ng/L Final    Fentanyl, Urine 11/14/2023 Negative  Negative Final    RBC, UA 11/14/2023 11-20 (A)  None Seen, 0-2 /HPF Final    WBC, UA 11/14/2023 0-2  None Seen, 0-2 /HPF Final    Bacteria, UA 11/14/2023 Trace (A)  None Seen /HPF Final    Squamous Epithelial Cells, UA 11/14/2023 3-6 (A)  None Seen, 0-2 /HPF Final    Hyaline Casts, UA 11/14/2023 21-30  None Seen /LPF Final    Methodology 11/14/2023 Manual Light Microscopy   Final    Site 11/14/2023 Right Brachial   Final    Dell's Test 11/14/2023 N/A   Final    pH, Arterial 11/14/2023 7.581 (C)  7.350 - 7.450 pH units Final    86 Value above critical limit    pCO2, Arterial 11/14/2023 25.0 (L)  35.0 - 45.0 mm Hg Final    84 Value below reference range    pO2, Arterial 11/14/2023 94.5  83.0 - 108.0 mm Hg Final    HCO3, Arterial 11/14/2023 23.5  20.0 - 26.0 mmol/L Final    Base Excess, Arterial 11/14/2023 3.7 (H)  0.0 - 2.0 mmol/L Final    O2 Saturation, Arterial 11/14/2023 97.8  94.0 - 99.0 % Final    Hemoglobin, Blood Gas 11/14/2023 19.6 (H)  14 - 18 g/dL Final    83 Value above reference range    Hematocrit, Blood Gas 11/14/2023 60.0 (H)  38.0 - 51.0 % Final    83 Value above reference range    Oxyhemoglobin 11/14/2023 97.9  94 - 99 % Final    Methemoglobin 11/14/2023 <-0.10 (L)  0.00 - 3.00 % Final    94 Value below reportable range  < _0.1    Carboxyhemoglobin 11/14/2023 0.4  0 - 5 % Final    A-a DO2 11/14/2023 21.8  0.0 - 300.0 mmHg Final    CO2 Content 11/14/2023 24.3  22 - 33 mmol/L Final    Barometric Pressure for Blood Gas 11/14/2023 735  mmHg Final    Modality 11/14/2023 Room Air   Final    FIO2 11/14/2023 21  % Final    Ventilator Mode 11/14/2023 NA   Final    Note 11/14/2023 Read back and acknowledge   Final    Notified Who 11/14/2023 DR LLAMAS // RN   Final    Notified By 11/14/2023 [id number]   Final    Notified Time 11/14/2023 11/14/2023 01:43   Final    Collected by 11/14/2023 [id number]   Final    Meter: [id number]     :  [id number]   Office Visit on 10/27/2023   Component Date Value Ref Range Status    Color 10/27/2023 Yellow  Yellow, Straw, Dark Yellow, Sandra Final    Clarity, UA 10/27/2023 Clear  Clear Final    Specific Gravity  10/27/2023 1.025  1.005 - 1.030 Final    pH, Urine 10/27/2023 5.0  5.0 - 8.0 Final    Leukocytes 10/27/2023 Negative  Negative Final    Nitrite, UA 10/27/2023 Negative  Negative Final    Protein, POC 10/27/2023 Trace (A)  Negative mg/dL Final    Glucose, UA 10/27/2023 Negative  Negative mg/dL Final    Ketones, UA 10/27/2023 Negative  Negative Final    Urobilinogen, UA 10/27/2023 Normal  Normal, 0.2 E.U./dL Final    Bilirubin 10/27/2023 Negative  Negative Final    Blood, UA 10/27/2023 Negative  Negative Final    Lot Number 10/27/2023 [id number]   Final    Expiration Date 10/27/2023 10/5/24   Final    PSA 10/27/2023 14.0 (H)  0.0 - 4.0 ng/mL Final    Roche ECLIA methodology.  According to the American Urological Association, Serum PSA should  decrease and remain at undetectable levels after radical  prostatectomy. The AUA defines biochemical recurrence as an initial  PSA value 0.2 ng/mL or greater followed by a subsequent confirmatory  PSA value 0.2 ng/mL or greater.  Values obtained with different assay methods or kits cannot be used  interchangeably. Results cannot be interpreted as absolute  evidence  of the presence or absence of malignant disease.    PSA, Free 10/27/2023 1.69  N/A ng/mL Final    Roche ECLIA methodology.    PSA, Free % 10/27/2023 12.1  % Final    The table below lists the probability of prostate cancer for  men with non-suspicious AMBER results and total PSA between  4 and 10 ng/mL, by patient age (Ania et al, VONDA 1998,  279:1542).                    % Free PSA       50-64 yr        65-75 yr                    0.00-10.00%        56%             55%                   10.01-15.00%        24%             35%                   15.01-20.00%        17%             23%                   20.01-25.00%        10%             20%                        >25.00%         5%              9%  Please note:  Ania et al did not make specific                recommendations regarding the use of                percent free PSA for any other population                of men.        Procedure: None  Procedures     I have reviewed and agree with the above PMH, PSH, FMH, social history, medications, allergies, and labs.     Assessment/Plan:   Problem List Items Addressed This Visit          Genitourinary and Reproductive     Enlarged prostate    Relevant Medications    Mirabegron ER (MYRBETRIQ) 25 MG tablet sustained-release 24 hour 24 hr tablet    Elevated PSA, between 10 and less than 20 ng/ml    Relevant Medications    Mirabegron ER (MYRBETRIQ) 25 MG tablet sustained-release 24 hour 24 hr tablet     Other Visit Diagnoses       Dysuria    -  Primary    Relevant Medications    phenazopyridine (Pyridium) 200 MG tablet            Health Maintenance:   Health Maintenance Due   Topic Date Due    COLORECTAL CANCER SCREENING  Never done    COVID-19 Vaccine (1) Never done    TDAP/TD VACCINES (1 - Tdap) Never done    ZOSTER VACCINE (1 of 2) Never done    Pneumococcal Vaccine 65+ (1 - PCV) Never done    HEPATITIS C SCREENING  Never done    ANNUAL WELLNESS VISIT  Never done    LIPID PANEL  Never done    INFLUENZA  VACCINE  Never done        Smoking Counseling: Never smoked or use smokeless tobacco    Urine Incontinence: Patient reports that he is not currently experiencing any symptoms of urinary incontinence.    Patient was given instructions and counseling regarding his condition or for health maintenance advice. Please see specific information pulled into the AVS if appropriate.    Patient Education:   Enlarged prostate/elevated PSA -patient has an elevated PSA around 14.  There is concern for possible prostatic carcinoma since he has had hardness of palpation to the prostate.  He is scheduled undergo biopsy on 12/21/2023.  Patient has been sending appropriate antibiotics and he was advised to take ciprofloxacin roughly 1 day prior to procedure.  Patient was advised to take his Valium the night before and 1 hour prior to procedure.  He was also advised to use his enema 1 to 2 hours prior to procedure.  Advised patient to take clindamycin twice daily starting the day after the procedure.  Advised patient to call if symptoms have not improved and reschedule biopsy.  Patient verbalized understanding.  Dysuria -patient's postvoid residual 0 mL in office today and urinalysis was completely unremarkable.  I will send in Pyridium for him to take up to 3 times daily as needed for burning with urination and bladder spasms.  Discussed the risk and benefits of this medication with the patient.  Patient verbalized understanding.  Urinary frequency -I will send in a refill of the patient's Myrbetriq 25 mg to take once nightly.  Discussed risk benefits of medication advised patient to discontinue if he begins to experience symptoms concerning for urinary retention.  Otherwise I will see the patient back for biopsy on 12/21/2023.  Advised him to go to the nearest ER symptoms worsen    Visit Diagnoses:    ICD-10-CM ICD-9-CM   1. Dysuria  R30.0 788.1   2. Enlarged prostate  N40.0 600.00   3. Benign prostatic hyperplasia with urinary  frequency  N40.1 600.01    R35.0 788.41   4. Elevated PSA, between 10 and less than 20 ng/ml  R97.20 790.93       Meds Ordered During Visit:  New Medications Ordered This Visit   Medications    Mirabegron ER (MYRBETRIQ) 25 MG tablet sustained-release 24 hour 24 hr tablet     Sig: Take 1 tablet by mouth Daily.     Dispense:  30 tablet     Refill:  0    phenazopyridine (Pyridium) 200 MG tablet     Sig: Take 1 tablet by mouth 3 (Three) Times a Day As Needed for Bladder Spasms.     Dispense:  20 tablet     Refill:  0       Follow Up Appointment: In office biopsy  No follow-ups on file.      This document has been electronically signed by Yohannes Mcgowan PA-C   December 14, 2023 18:49 EST    Part of this note may be an electronic transcription/translation of spoken language to printed text using the Dragon Dictation System.

## 2023-12-21 ENCOUNTER — PROCEDURE VISIT (OUTPATIENT)
Dept: UROLOGY | Facility: CLINIC | Age: 73
End: 2023-12-21
Payer: OTHER GOVERNMENT

## 2023-12-21 VITALS
DIASTOLIC BLOOD PRESSURE: 74 MMHG | HEART RATE: 94 BPM | SYSTOLIC BLOOD PRESSURE: 113 MMHG | BODY MASS INDEX: 22.35 KG/M2 | WEIGHT: 165 LBS | HEIGHT: 72 IN

## 2023-12-21 DIAGNOSIS — N40.0 ENLARGED PROSTATE: ICD-10-CM

## 2023-12-21 DIAGNOSIS — R97.20 ELEVATED PROSTATE SPECIFIC ANTIGEN (PSA): Primary | ICD-10-CM

## 2023-12-21 DIAGNOSIS — R97.20 ELEVATED PSA, BETWEEN 10 AND LESS THAN 20 NG/ML: ICD-10-CM

## 2023-12-21 PROCEDURE — 55700 PR PROSTATE NEEDLE BIOPSY ANY APPROACH: CPT | Performed by: UROLOGY

## 2023-12-21 PROCEDURE — 76942 ECHO GUIDE FOR BIOPSY: CPT | Performed by: UROLOGY

## 2023-12-21 RX ORDER — GENTAMICIN SULFATE 40 MG/ML
80 INJECTION, SOLUTION INTRAMUSCULAR; INTRAVENOUS ONCE
Status: COMPLETED | OUTPATIENT
Start: 2023-12-21 | End: 2023-12-21

## 2023-12-21 RX ADMIN — GENTAMICIN SULFATE 80 MG: 40 INJECTION, SOLUTION INTRAMUSCULAR; INTRAVENOUS at 14:12

## 2023-12-21 NOTE — PROGRESS NOTES
Chief Complaint:    Elevated PSA    HPI:   72 y.o. male here for biopsy    Past Medical History:     Past Medical History:   Diagnosis Date    Hyperlipidemia     Hypertension     Prostatic enlargement        Current Meds:     Current Outpatient Medications   Medication Sig Dispense Refill    diazePAM (VALIUM) 10 MG tablet One tablet night before procedure at bedtime and one morning of procedure 2 tablet 0    diphenoxylate-atropine (LOMOTIL) 2.5-0.025 MG per tablet Take 1 tablet by mouth 4 (Four) Times a Day As Needed for Diarrhea. 6 tablet 0    finasteride (PROSCAR) 5 MG tablet Take 1 tablet by mouth Daily. 30 tablet 5    gabapentin (NEURONTIN) 300 MG capsule Take 1 capsule by mouth 2 (Two) Times a Day.      HYDROcodone-acetaminophen (NORCO) 5-325 MG per tablet Take 1 tablet by mouth Every 8 (Eight) Hours As Needed for Severe Pain. 9 tablet 0    Mirabegron ER (MYRBETRIQ) 25 MG tablet sustained-release 24 hour 24 hr tablet Take 1 tablet by mouth Daily. 30 tablet 0    phenazopyridine (Pyridium) 200 MG tablet Take 1 tablet by mouth 3 (Three) Times a Day As Needed for Bladder Spasms. 20 tablet 0    pravastatin (PRAVACHOL) 40 MG tablet Take 1 tablet by mouth Daily.      tamsulosin (FLOMAX) 0.4 MG capsule 24 hr capsule Take 1 capsule by mouth Every Night.      traZODone (DESYREL) 50 MG tablet Take 1 tablet by mouth Every Night.      ciprofloxacin (Cipro) 500 MG tablet Take one tablet twice a day before procedure (Patient not taking: Reported on 12/21/2023) 4 tablet 0     No current facility-administered medications for this visit.        Allergies:      No Known Allergies     Past Surgical History:     Past Surgical History:   Procedure Laterality Date    BACK SURGERY         Social History:     Social History     Socioeconomic History    Marital status:    Tobacco Use    Smoking status: Never    Smokeless tobacco: Never   Vaping Use    Vaping Use: Never used   Substance and Sexual Activity    Alcohol use: Yes      Comment: occasional    Drug use: No    Sexual activity: Never       Family History:     No family history on file.    Review of Systems:     Review of Systems   Constitutional: Negative.    HENT: Negative.     Eyes: Negative.    Respiratory: Negative.     Cardiovascular: Negative.    Gastrointestinal: Negative.    Endocrine: Negative.    Musculoskeletal: Negative.    Allergic/Immunologic: Negative.    Neurological: Negative.    Hematological: Negative.    Psychiatric/Behavioral: Negative.         Physical Exam:     Physical Exam  Vitals and nursing note reviewed.   Constitutional:       Appearance: He is well-developed.   HENT:      Head: Normocephalic and atraumatic.   Eyes:      Conjunctiva/sclera: Conjunctivae normal.      Pupils: Pupils are equal, round, and reactive to light.   Cardiovascular:      Rate and Rhythm: Normal rate and regular rhythm.      Heart sounds: Normal heart sounds.   Pulmonary:      Effort: Pulmonary effort is normal.      Breath sounds: Normal breath sounds.   Abdominal:      General: Bowel sounds are normal.      Palpations: Abdomen is soft.   Musculoskeletal:         General: Normal range of motion.      Cervical back: Normal range of motion.   Skin:     General: Skin is warm and dry.   Neurological:      Mental Status: He is alert and oriented to person, place, and time.      Deep Tendon Reflexes: Reflexes are normal and symmetric.   Psychiatric:         Behavior: Behavior normal.         Thought Content: Thought content normal.         Judgment: Judgment normal.         I have reviewed the following portions of the patient's history: Allergies, current medications, past family history, past medical history, past social history, past surgical history, problem list, and ROS and confirm it is accurate.    Recent Image (CT and/or KUB):      CT Abdomen and Pelvis: Results for orders placed during the hospital encounter of 11/13/23    CT Abdomen Pelvis With & Without  Contrast    Narrative  EXAM: CT ABDOMEN PELVIS W WO CONTRAST-      TECHNIQUE: Multiple axial CT images were obtained from lung bases  through pubic symphysis WITHOUT AND THEN AFTER THE administration of IV  contrast. Reformatted images in the coronal and/or sagittal plane(s)  were generated from the axial data set to facilitate diagnostic accuracy  and/or surgical planning.  Oral Contrast:NONE.    Radiation dose reduction techniques were utilized per ALARA protocol.  Automated exposure control was initiated through either or [employer] or  [employer] software packages by  protocol.  DOSE:    Clinical information Abdominal pain, acute, nonlocalized; I10-Essential  (primary) hypertension; B34.9-Viral infection, unspecified    Comparison 7/18/2023    FINDINGS:    Lower thorax: Clear. No effusions.    Abdomen:    Liver: Homogeneous. No focal hepatic mass or ductal dilatation.    Gallbladder: No dilation or stone identified.    Pancreas: Prominence of the pancreatic duct. Patient motion accentuates  what may be peripancreatic stranding but I favor this being artifact.    Spleen: Homogeneous. No splenomegaly.    Adrenals: No mass.    Kidneys/ureters: No mass. No obstructive uropathy.  No evidence of  urolithiasis.    GI tract: Sigmoid colon is thick-walled and there are diverticuli  without diverticulitis. There is no evidence of appendicitis    MESENTERY: No free fluid, walled off fluid collections, mesenteric  stranding, or enlarged lymph nodes      Vasculature: Evidence of atherosclerotic vascular disease    Abdominal wall: No focal hernia or mass.      Bladder: Abnormal urinary bladder wall thickening    Reproductive: Prostate enlargement    Bones: Grade 1 anterior listhesis of L5 on S1    Impression  1. Mild dilatation of the pancreatic duct and cannot exclude  peripancreatic stranding but I favor this representing artifact due to  extensive respiratory motion during the exam    2. Sigmoid diverticuli  without diverticulitis. Also mild thickening of  the sigmoid colon wall. Consider direct visualization    3. Urinary bladder wall thickening    4. Other findings as above                  This report was finalized on 11/14/2023 9:29 AM by Dr. Zachary Diallo MD.       CT Stone Protocol: No results found for this or any previous visit.       KUB: No results found for this or any previous visit.       Labs (past 3 months):      Admission on 11/13/2023, Discharged on 11/14/2023   Component Date Value Ref Range Status    QT Interval 11/13/2023 346  ms Final    QTC Interval 11/13/2023 465  ms Final    Glucose 11/13/2023 179 (H)  65 - 99 mg/dL Final    BUN 11/13/2023 15  8 - 23 mg/dL Final    Creatinine 11/13/2023 1.04  0.76 - 1.27 mg/dL Final    Sodium 11/13/2023 138  136 - 145 mmol/L Final    Potassium 11/13/2023 4.4  3.5 - 5.2 mmol/L Final    Slight hemolysis detected by analyzer. Result may be falsely elevated.    Chloride 11/13/2023 96 (L)  98 - 107 mmol/L Final    CO2 11/13/2023 18.0 (L)  22.0 - 29.0 mmol/L Final    Calcium 11/13/2023 10.6 (H)  8.6 - 10.5 mg/dL Final    Total Protein 11/13/2023 8.4  6.0 - 8.5 g/dL Final    Albumin 11/13/2023 5.3 (H)  3.5 - 5.2 g/dL Final    ALT (SGPT) 11/13/2023 13  1 - 41 U/L Final    AST (SGOT) 11/13/2023 23  1 - 40 U/L Final    Alkaline Phosphatase 11/13/2023 117  39 - 117 U/L Final    Total Bilirubin 11/13/2023 0.7  0.0 - 1.2 mg/dL Final    Globulin 11/13/2023 3.1  gm/dL Final    A/G Ratio 11/13/2023 1.7  g/dL Final    BUN/Creatinine Ratio 11/13/2023 14.4  7.0 - 25.0 Final    Anion Gap 11/13/2023 24.0 (H)  5.0 - 15.0 mmol/L Final    eGFR 11/13/2023 75.3  >60.0 mL/min/1.73 Final    HS Troponin T 11/13/2023 46 (H)  <22 ng/L Final    Extra Tube 11/13/2023 Hold for add-ons.   Final    Auto resulted.    Extra Tube 11/13/2023 hold for add-on   Final    Auto resulted    Extra Tube 11/13/2023 Hold for add-ons.   Final    Auto resulted.    Extra Tube 11/13/2023 Hold for add-ons.   Final     Auto resulted    WBC 11/13/2023 16.96 (H)  3.40 - 10.80 10*3/mm3 Final    RBC 11/13/2023 6.33 (H)  4.14 - 5.80 10*6/mm3 Final    Hemoglobin 11/13/2023 19.9 (H)  13.0 - 17.7 g/dL Final    Hematocrit 11/13/2023 59.1 (H)  37.5 - 51.0 % Final    MCV 11/13/2023 93.4  79.0 - 97.0 fL Final    MCH 11/13/2023 31.4  26.6 - 33.0 pg Final    MCHC 11/13/2023 33.7  31.5 - 35.7 g/dL Final    RDW 11/13/2023 13.2  12.3 - 15.4 % Final    RDW-SD 11/13/2023 45.0  37.0 - 54.0 fl Final    MPV 11/13/2023 9.7  6.0 - 12.0 fL Final    Platelets 11/13/2023 311  140 - 450 10*3/mm3 Final    Neutrophil % 11/13/2023 88.9 (H)  42.7 - 76.0 % Final    Lymphocyte % 11/13/2023 4.9 (L)  19.6 - 45.3 % Final    Monocyte % 11/13/2023 5.7  5.0 - 12.0 % Final    Eosinophil % 11/13/2023 0.0 (L)  0.3 - 6.2 % Final    Basophil % 11/13/2023 0.2  0.0 - 1.5 % Final    Immature Grans % 11/13/2023 0.3  0.0 - 0.5 % Final    Neutrophils, Absolute 11/13/2023 15.08 (H)  1.70 - 7.00 10*3/mm3 Final    Lymphocytes, Absolute 11/13/2023 0.83  0.70 - 3.10 10*3/mm3 Final    Monocytes, Absolute 11/13/2023 0.97 (H)  0.10 - 0.90 10*3/mm3 Final    Eosinophils, Absolute 11/13/2023 0.00  0.00 - 0.40 10*3/mm3 Final    Basophils, Absolute 11/13/2023 0.03  0.00 - 0.20 10*3/mm3 Final    Immature Grans, Absolute 11/13/2023 0.05  0.00 - 0.05 10*3/mm3 Final    nRBC 11/13/2023 0.0  0.0 - 0.2 /100 WBC Final    COVID19 11/13/2023 Not Detected  Not Detected - Ref. Range Final    Influenza A PCR 11/13/2023 Not Detected  Not Detected Final    Influenza B PCR 11/13/2023 Not Detected  Not Detected Final    THC, Screen, Urine 11/14/2023 Positive (A)  Negative Final    Phencyclidine (PCP), Urine 11/14/2023 Negative  Negative Final    Cocaine Screen, Urine 11/14/2023 Negative  Negative Final    Methamphetamine, Ur 11/14/2023 Negative  Negative Final    Opiate Screen 11/14/2023 Negative  Negative Final    Amphetamine Screen, Urine 11/14/2023 Negative  Negative Final    Benzodiazepine Screen, Urine  11/14/2023 Negative  Negative Final    Tricyclic Antidepressants Screen 11/14/2023 Negative  Negative Final    Methadone Screen, Urine 11/14/2023 Negative  Negative Final    Barbiturates Screen, Urine 11/14/2023 Negative  Negative Final    Oxycodone Screen, Urine 11/14/2023 Negative  Negative Final    Buprenorphine, Screen, Urine 11/14/2023 Negative  Negative Final    Color, UA 11/14/2023 Yellow  Yellow, Straw Final    Appearance, UA 11/14/2023 Cloudy (A)  Clear Final    pH, UA 11/14/2023 5.5  5.0 - 8.0 Final    Specific Gravity, UA 11/14/2023 >=1.030  1.005 - 1.030 Final    Glucose, UA 11/14/2023 100 mg/dL (Trace) (A)  Negative Final    Ketones, UA 11/14/2023 40 mg/dL (2+) (A)  Negative Final    Bilirubin, UA 11/14/2023 Negative  Negative Final    Blood, UA 11/14/2023 Large (3+) (A)  Negative Final    Protein, UA 11/14/2023 >=300 mg/dL (3+) (A)  Negative Final    Leuk Esterase, UA 11/14/2023 Negative  Negative Final    Nitrite, UA 11/14/2023 Negative  Negative Final    Urobilinogen, UA 11/14/2023 0.2 E.U./dL  0.2 - 1.0 E.U./dL Final    HS Troponin T 11/14/2023 44 (H)  <22 ng/L Final    Troponin T Delta 11/14/2023 -2  >=-4 - <+4 ng/L Final    Fentanyl, Urine 11/14/2023 Negative  Negative Final    RBC, UA 11/14/2023 11-20 (A)  None Seen, 0-2 /HPF Final    WBC, UA 11/14/2023 0-2  None Seen, 0-2 /HPF Final    Bacteria, UA 11/14/2023 Trace (A)  None Seen /HPF Final    Squamous Epithelial Cells, UA 11/14/2023 3-6 (A)  None Seen, 0-2 /HPF Final    Hyaline Casts, UA 11/14/2023 21-30  None Seen /LPF Final    Methodology 11/14/2023 Manual Light Microscopy   Final    Site 11/14/2023 Right Brachial   Final    Dell's Test 11/14/2023 N/A   Final    pH, Arterial 11/14/2023 7.581 (C)  7.350 - 7.450 pH units Final    86 Value above critical limit    pCO2, Arterial 11/14/2023 25.0 (L)  35.0 - 45.0 mm Hg Final    84 Value below reference range    pO2, Arterial 11/14/2023 94.5  83.0 - 108.0 mm Hg Final    HCO3, Arterial 11/14/2023  23.5  20.0 - 26.0 mmol/L Final    Base Excess, Arterial 11/14/2023 3.7 (H)  0.0 - 2.0 mmol/L Final    O2 Saturation, Arterial 11/14/2023 97.8  94.0 - 99.0 % Final    Hemoglobin, Blood Gas 11/14/2023 19.6 (H)  14 - 18 g/dL Final    83 Value above reference range    Hematocrit, Blood Gas 11/14/2023 60.0 (H)  38.0 - 51.0 % Final    83 Value above reference range    Oxyhemoglobin 11/14/2023 97.9  94 - 99 % Final    Methemoglobin 11/14/2023 <-0.10 (L)  0.00 - 3.00 % Final    94 Value below reportable range < _0.1    Carboxyhemoglobin 11/14/2023 0.4  0 - 5 % Final    A-a DO2 11/14/2023 21.8  0.0 - 300.0 mmHg Final    CO2 Content 11/14/2023 24.3  22 - 33 mmol/L Final    Barometric Pressure for Blood Gas 11/14/2023 735  mmHg Final    Modality 11/14/2023 Room Air   Final    FIO2 11/14/2023 21  % Final    Ventilator Mode 11/14/2023 NA   Final    Note 11/14/2023 Read back and acknowledge   Final    Notified Who 11/14/2023 DR LLAMAS // RN   Final    Notified By 11/14/2023 [id number]   Final    Notified Time 11/14/2023 11/14/2023 01:43   Final    Collected by 11/14/2023 [id number]   Final    Meter: [id number]     :  [id number]   Office Visit on 10/27/2023   Component Date Value Ref Range Status    Color 10/27/2023 Yellow  Yellow, Straw, Dark Yellow, Sandra Final    Clarity, UA 10/27/2023 Clear  Clear Final    Specific Gravity  10/27/2023 1.025  1.005 - 1.030 Final    pH, Urine 10/27/2023 5.0  5.0 - 8.0 Final    Leukocytes 10/27/2023 Negative  Negative Final    Nitrite, UA 10/27/2023 Negative  Negative Final    Protein, POC 10/27/2023 Trace (A)  Negative mg/dL Final    Glucose, UA 10/27/2023 Negative  Negative mg/dL Final    Ketones, UA 10/27/2023 Negative  Negative Final    Urobilinogen, UA 10/27/2023 Normal  Normal, 0.2 E.U./dL Final    Bilirubin 10/27/2023 Negative  Negative Final    Blood, UA 10/27/2023 Negative  Negative Final    Lot Number 10/27/2023 [id number]   Final    Expiration Date 10/27/2023 10/5/24    Final    PSA 10/27/2023 14.0 (H)  0.0 - 4.0 ng/mL Final    Roche ECLIA methodology.  According to the American U[employer] Association, Serum PSA should  decrease and remain at undetectable levels after radical  prostatectomy. The AUA defines biochemical recurrence as an initial  PSA value 0.2 ng/mL or greater followed by a subsequent confirmatory  PSA value 0.2 ng/mL or greater.  Values obtained with different assay methods or kits cannot be used  interchangeably. Results cannot be interpreted as absolute evidence  of the presence or absence of malignant disease.    PSA, Free 10/27/2023 1.69  N/A ng/mL Final    Roche ECLIA methodology.    PSA, Free % 10/27/2023 12.1  % Final    The table below lists the probability of prostate cancer for  men with non-suspicious AMBER results and total PSA between  4 and 10 ng/mL, by patient age (Ania et al, VONDA 1998,  279:1542).                    % Free PSA       50-64 yr        65-75 yr                    0.00-10.00%        56%             55%                   10.01-15.00%        24%             35%                   15.01-20.00%        17%             23%                   20.01-25.00%        10%             20%                        >25.00%         5%              9%  Please note:  Ania et al did not make specific                recommendations regarding the use of                percent free PSA for any other population                of men.        Procedure:   Prostate ultrasound and biopsy:  73  year-old white male with a history of an elevated PSA and a significant increase in his risk for prostate cancer.  We discussed the prostate biopsy at length.  We discussed the significant risks of anesthesia, bleeding, infection, urosepsis, purported effects on erectile function, and rectal bleeding requiring surgical intervention.  He was given a pre-procedural enema.  He was pretreated with ciprofloxacin for 3 days.  He was given periprocedural gentamicin at the dose of  80 mg in an intramuscular fashion and given post biopsy clindamycin for 3 days.  Following an extensive informed consent, he was brought to the operative suite, placed in the left lateral decubitus position.  He was given his prophylactic gentamicin.  The rectum was anesthetized with 20 mL of 2% viscous Xylocaine jelly.  I then used the BK biplanar probe inserted into the rectum and made measurements of the prostate.  The height was 4.38 cm, the width was 4.96 cm, and the length was 6.80 cm for a volume of 76.82 g.  There were no obvious hypoechoic areas.  There was no intravesical median lobe.  There was minimal calcification between the transition and peripheral zone.  I then injected 20 mL of 1% Xylocaine in the perirectal area and raised of skin wheal to provide anesthesia after an adequate period of topical anesthesia. I used the Biopty gun to take a total of 10 cores without complication.  He tolerated this extremely well.  Cores.  There was no bleeding or complications.   Impression: Elevated PSA s/p biopsy.    Assessment/Plan:   Elevated prostate specific antigen-we discussed the diagnosis of elevated prostate-specific antigen.  I explained the pathophysiology of PSA.  It is a serine protease that's function in the male reproductive tract is to facilitate the liquefaction of semen.  It is for this reason the body does not want it freely floating in the serum and why typically bound tightly to albumin.  We discussed why we used both a PSA free and total to determine the need for more aggressive therapy. I discussed the normal range.  Additionally, it was in the range of 1 to 4, but more recently has been downgraded to something less than 2 or even approaching 1.  I discussed the risk of family history, particularly the fact that the average male has a 14% risk of prostate cancer and that in the face of a positive diagnosis in a father it will tablet and any other first-generation relative continued tablet  insofar that a father and brother with prostate cancer will produce almost a 50% risk of prostate cancer.  I discussed the use of the temporal use of PSA as the best option for monitoring.  We also discussed the fact that an elevated PSA is an isolated event does not mean that this is prostate cancer and should not engender worry in this regard. I discussed other things that can elevate PSA including constipation, prostatitis, infection, recent intercourse etc., as well as the risks and benefits associated with this.  Also discussed the fact that this is with a dilutional test and as a consequence of such were present produce slight variations on a single specimen.  Further discussed the risks and benefits of a prostate biopsy as well.          This document has been electronically signed by ALTHEA RESENDEZ MD December 21, 2023 13:41 EST    Dictated Utilizing Dragon Dictation: Part of this note may be an electronic transcription/translation of spoken language to printed text using the Dragon Dictation System.

## 2023-12-22 LAB — REF LAB TEST METHOD: NORMAL

## 2023-12-28 ENCOUNTER — OFFICE VISIT (OUTPATIENT)
Dept: UROLOGY | Facility: CLINIC | Age: 73
End: 2023-12-28
Payer: OTHER GOVERNMENT

## 2023-12-28 VITALS
HEIGHT: 72 IN | WEIGHT: 165 LBS | HEART RATE: 83 BPM | DIASTOLIC BLOOD PRESSURE: 74 MMHG | SYSTOLIC BLOOD PRESSURE: 166 MMHG | BODY MASS INDEX: 22.35 KG/M2

## 2023-12-28 DIAGNOSIS — C61 PROSTATE CANCER: Primary | ICD-10-CM

## 2023-12-28 PROCEDURE — 99214 OFFICE O/P EST MOD 30 MIN: CPT | Performed by: UROLOGY

## 2023-12-28 NOTE — PROGRESS NOTES
Chief Complaint:      Chief Complaint   Patient presents with    Biopsy Results       HPI:   73 y.o. male turns today accompanied by his stepdaughter.  He has a positive biopsy for T2c disease [place]'s 6 and 7.  We discussed his options at great length he has heavy agent orange exposure in [place] in 1968.  I would initiate a staging workup with CT total body bone scan and an Oncotype examination I will follow-up with him based this in 3 weeks we will discuss fine-tuning at [facility]    Past Medical History:     Past Medical History:   Diagnosis Date    Hyperlipidemia     Hypertension     Prostatic enlargement        Current Meds:     Current Outpatient Medications   Medication Sig Dispense Refill    ciprofloxacin (Cipro) 500 MG tablet Take one tablet twice a day before procedure 4 tablet 0    diazePAM (VALIUM) 10 MG tablet One tablet night before procedure at bedtime and one morning of procedure 2 tablet 0    diphenoxylate-atropine (LOMOTIL) 2.5-0.025 MG per tablet Take 1 tablet by mouth 4 (Four) Times a Day As Needed for Diarrhea. 6 tablet 0    finasteride (PROSCAR) 5 MG tablet Take 1 tablet by mouth Daily. 30 tablet 5    gabapentin (NEURONTIN) 300 MG capsule Take 1 capsule by mouth 2 (Two) Times a Day.      HYDROcodone-acetaminophen (NORCO) 5-325 MG per tablet Take 1 tablet by mouth Every 8 (Eight) Hours As Needed for Severe Pain. 9 tablet 0    Mirabegron ER (MYRBETRIQ) 25 MG tablet sustained-release 24 hour 24 hr tablet Take 1 tablet by mouth Daily. 30 tablet 0    phenazopyridine (Pyridium) 200 MG tablet Take 1 tablet by mouth 3 (Three) Times a Day As Needed for Bladder Spasms. 20 tablet 0    pravastatin (PRAVACHOL) 40 MG tablet Take 1 tablet by mouth Daily.      tamsulosin (FLOMAX) 0.4 MG capsule 24 hr capsule Take 1 capsule by mouth Every Night.      traZODone (DESYREL) 50 MG tablet Take 1 tablet by mouth  Every Night.       No current facility-administered medications for this visit.        Allergies:      No Known Allergies     Past Surgical History:     Past Surgical History:   Procedure Laterality Date    BACK SURGERY         Social History:     Social History     Socioeconomic History    Marital status:    Tobacco Use    Smoking status: Never    Smokeless tobacco: Never   Vaping Use    Vaping Use: Never used   Substance and Sexual Activity    Alcohol use: Yes     Comment: occasional    Drug use: No    Sexual activity: Never       Family History:     History reviewed. No pertinent family history.    Review of Systems:     Review of Systems   Constitutional: Negative.    HENT: Negative.     Eyes: Negative.    Respiratory: Negative.     Cardiovascular: Negative.    Gastrointestinal: Negative.    Endocrine: Negative.    Musculoskeletal: Negative.    Allergic/Immunologic: Negative.    Neurological: Negative.    Hematological: Negative.    Psychiatric/Behavioral: Negative.         Physical Exam:     Physical Exam  Vitals and nursing note reviewed.   Constitutional:       Appearance: He is well-developed.   HENT:      Head: Normocephalic and atraumatic.   Eyes:      Conjunctiva/sclera: Conjunctivae normal.      Pupils: Pupils are equal, round, and reactive to light.   Cardiovascular:      Rate and Rhythm: Normal rate and regular rhythm.      Heart sounds: Normal heart sounds.   Pulmonary:      Effort: Pulmonary effort is normal.      Breath sounds: Normal breath sounds.   Abdominal:      General: Bowel sounds are normal.      Palpations: Abdomen is soft.   Musculoskeletal:         General: Normal range of motion.      Cervical back: Normal range of motion.   Skin:     General: Skin is warm and dry.   Neurological:      Mental Status: He is alert and oriented to person, place, and time.      Deep Tendon Reflexes: Reflexes are normal and symmetric.   Psychiatric:         Behavior: Behavior normal.         Thought  Content: Thought content normal.         Judgment: Judgment normal.         I have reviewed the following portions of the patient's history: Allergies, current medications, past family history, past medical history, past social history, past surgical history, problem list, and ROS and confirm it is accurate.    Recent Image (CT and/or KUB):      CT Abdomen and Pelvis: Results for orders placed during the hospital encounter of 11/13/23    CT Abdomen Pelvis With & Without Contrast    Narrative  EXAM: CT ABDOMEN PELVIS W WO CONTRAST-      TECHNIQUE: Multiple axial CT images were obtained from lung bases  through pubic symphysis WITHOUT AND THEN AFTER THE administration of IV  contrast. Reformatted images in the coronal and/or sagittal plane(s)  were generated from the axial data set to facilitate diagnostic accuracy  and/or surgical planning.  Oral Contrast:NONE.    Radiation dose reduction techniques were utilized per ALARA protocol.  Automated exposure control was initiated through either or [employer] or  [employer] software packages by  protocol.  DOSE:    Clinical information Abdominal pain, acute, nonlocalized; I10-Essential  (primary) hypertension; B34.9-Viral infection, unspecified    Comparison 7/18/2023    FINDINGS:    Lower thorax: Clear. No effusions.    Abdomen:    Liver: Homogeneous. No focal hepatic mass or ductal dilatation.    Gallbladder: No dilation or stone identified.    Pancreas: Prominence of the pancreatic duct. Patient motion accentuates  what may be peripancreatic stranding but I favor this being artifact.    Spleen: Homogeneous. No splenomegaly.    Adrenals: No mass.    Kidneys/ureters: No mass. No obstructive uropathy.  No evidence of  urolithiasis.    GI tract: Sigmoid colon is thick-walled and there are diverticuli  without diverticulitis. There is no evidence of appendicitis    MESENTERY: No free fluid, walled off fluid collections, mesenteric  stranding, or enlarged lymph  nodes      Vasculature: Evidence of atherosclerotic vascular disease    Abdominal wall: No focal hernia or mass.      Bladder: Abnormal urinary bladder wall thickening    Reproductive: Prostate enlargement    Bones: Grade 1 anterior listhesis of L5 on S1    Impression  1. Mild dilatation of the pancreatic duct and cannot exclude  peripancreatic stranding but I favor this representing artifact due to  extensive respiratory motion during the exam    2. Sigmoid diverticuli without diverticulitis. Also mild thickening of  the sigmoid colon wall. Consider direct visualization    3. Urinary bladder wall thickening    4. Other findings as above                  This report was finalized on 2023 9:29 AM by Dr. Zachary Diallo MD.       CT Stone Protocol: No results found for this or any previous visit.       KUB: No results found for this or any previous visit.       Labs (past 3 months):      Procedure visit on 2023   Component Date Value Ref Range Status    Reference Lab Report 2023    Final                    Value:[facility]  [address] [place] [address]  Phone: [phone] or [phone]  Fax: [phone]  Dajuan Lorenzo M.D., Medical Director    PATIENT NAME                                     LABORATORY NO.  [id number]   FRANCESCO BARNES                                 [id number]  [id number]                                 AGE                    SEX   N              CLIENT REF #  [facility] [place]              72        1950      LILIAM     [id number]      [id number]    [address] [facility] [address]                    REQUESTING M.D.           ATTENDING [place] [address]                           ALTHEA RESENDEZ  DATE COLLECTED            DATE RECEIVED          DATE REPORTED  2023    DIAGNOSIS:  A.     PROSTATE, NEEDLE CORE BIOPSY, RIGHT APEX:  Prostatic acinar  adenocarcinoma, grade group 1 ([place] 3+3), 4 mm extent  B.     PROSTATE, NEEDLE CORE BIOPSY, RIGHT                           MID:  Prostatic acinar adenocarcinoma, grade group 2 ([place] 3+4), 9 mm extent  C.     PROSTATE, NEEDLE CORE BIOPSY, RIGHT BASE:  Prostatic acinar adenocarcinoma, grade group 1 ([place]helo 3+3), 3 mm extent  D.     PROSTATE, NEEDLE CORE BIOPSY, RIGHT LATERAL MID:  Prostatic acinar adenocarcinoma, grade group 2 ([place] 3+4), 8 mm  extent, with focal suggestion of perineural invasion  E.     PROSTATE, NEEDLE CORE BIOPSY, LEFT APEX:  Benign prostatic tissue  F.     PROSTATE, NEEDLE CORE BIOPSY, LEFT MID:  Prostatic acinar adenocarcinoma, grade group 3 ([place] 4+3), 7 mm  extent, with focal perineural invasion  G.     PROSTATE, NEEDLE CORE BIOPSY, LEFT BASE:  Prostatic acinar adenocarcinoma, grade group 3 ([place] 4+3), 1 mm extent  H.     PROSTATE, NEEDLE CORE BIOPSY, LEFT LATERAL BASE:  Prostatic acinar adenocarcinoma, grade group 2 (Chelo 3+4), 10 mm  extent, with focal perineural invasion    CAM    CLINICAL HISTORY:  Elevated prostate specific antigen (PSA)    SPECIMENS RECEIVED:  A.                              PROSTATE, NEEDLE CORE BIOPSY, RIGHT APEX  B.    PROSTATE, NEEDLE CORE BIOPSY, RIGHT MID  C.    PROSTATE, NEEDLE CORE BIOPSY, RIGHT BASE  D.    PROSTATE, NEEDLE CORE BIOPSY, RIGHT LATERAL MID  E.    PROSTATE, NEEDLE CORE BIOPSY, LEFT APEX  F.    PROSTATE, NEEDLE CORE BIOPSY, LEFT MID  G.    PROSTATE, NEEDLE CORE BIOPSY, LEFT BASE  H.    PROSTATE, NEEDLE CORE BIOPSY, LEFT LATERAL BASE    MICROSCOPIC DESCRIPTION:  Tissue blocks are prepared and slides are examined microscopically on all  specimens. See diagnosis for details.    Professional interpretation rendered by Marianna Jiang M.D., F.C.A.P. at  [employer] [employer], [address] [place] [id number].    GROSS DESCRIPTION:  A.    Labeled right apex is a 0.7 x 0.1 x 0.1 cm tan needle core which is  submitted entirely in 1 block.   BW  B.    Labeled right mid is a 1.3 x 0.1 x 0.1 cm tan needle core which is  submitted entirely 1 block.  C.    Labeled right base is a 1.0 x 0.1 x 0.1 cm tan needle core which is  submitted entirely in 1                           block.  D.    Labeled right lateral mid is a 1.0 x 0.1 x 0.1 cm tan needle core which is  submitted entirely in 1 block.  E.    Labeled left mid is a 1.0 x 0.1 x 0.1 cm tan needle core which is submitted  entirely in 1 block.  F.    Labeled left base is a 1.0 x 0.1 x 0.1 cm tan needle core which is submitted  entirely in 1 block.  G.    Labeled left lateral mid is a 1.0 x 0.1 x 0.1 cm tan needle core which is  submitted entirely in 1 block.  H.    Labeled left lateral base is a 1.0 x 0.1 x 0.1 cm tan needle core which is  submitted entirely 1 block.    REVIEWED, DIAGNOSED AND ELECTRONICALLY  SIGNED BY:    Marianna Jiang M.D., F.C.A.P.  CPT CODES:  88305x8     Admission on 11/13/2023, Discharged on 11/14/2023   Component Date Value Ref Range Status    QT Interval 11/13/2023 346  ms Final    QTC Interval 11/13/2023 465  ms Final    Glucose 11/13/2023 179 (H)  65 - 99 mg/dL Final    BUN 11/13/2023 15  8 - 23 mg/dL Final    Creatinine 11/13/2023 1.04  0.76 - 1.27 mg/dL Final    Sodium 11/13/2023 138  136 - 145 mmol/L Final    Potassium 11/13/2023 4.4  3.5 - 5.2 mmol/L Final    Slight hemolysis detected by analyzer. Result may be falsely elevated.    Chloride 11/13/2023 96 (L)  98 - 107 mmol/L Final    CO2 11/13/2023 18.0 (L)  22.0 - 29.0 mmol/L Final    Calcium 11/13/2023 10.6 (H)  8.6 - 10.5 mg/dL Final    Total Protein 11/13/2023 8.4  6.0 - 8.5 g/dL Final    Albumin 11/13/2023 5.3 (H)  3.5 - 5.2 g/dL Final    ALT (SGPT) 11/13/2023 13  1 - 41 U/L Final    AST (SGOT) 11/13/2023 23  1 - 40 U/L Final    Alkaline Phosphatase 11/13/2023 117  39 - 117 U/L Final    Total Bilirubin 11/13/2023 0.7  0.0 - 1.2 mg/dL Final    Globulin 11/13/2023 3.1  gm/dL Final    A/G Ratio 11/13/2023 1.7  g/dL  Final    BUN/Creatinine Ratio 11/13/2023 14.4  7.0 - 25.0 Final    Anion Gap 11/13/2023 24.0 (H)  5.0 - 15.0 mmol/L Final    eGFR 11/13/2023 75.3  >60.0 mL/min/1.73 Final    HS Troponin T 11/13/2023 46 (H)  <22 ng/L Final    Extra Tube 11/13/2023 Hold for add-ons.   Final    Auto resulted.    Extra Tube 11/13/2023 hold for add-on   Final    Auto resulted    Extra Tube 11/13/2023 Hold for add-ons.   Final    Auto resulted.    Extra Tube 11/13/2023 Hold for add-ons.   Final    Auto resulted    WBC 11/13/2023 16.96 (H)  3.40 - 10.80 10*3/mm3 Final    RBC 11/13/2023 6.33 (H)  4.14 - 5.80 10*6/mm3 Final    Hemoglobin 11/13/2023 19.9 (H)  13.0 - 17.7 g/dL Final    Hematocrit 11/13/2023 59.1 (H)  37.5 - 51.0 % Final    MCV 11/13/2023 93.4  79.0 - 97.0 fL Final    MCH 11/13/2023 31.4  26.6 - 33.0 pg Final    MCHC 11/13/2023 33.7  31.5 - 35.7 g/dL Final    RDW 11/13/2023 13.2  12.3 - 15.4 % Final    RDW-SD 11/13/2023 45.0  37.0 - 54.0 fl Final    MPV 11/13/2023 9.7  6.0 - 12.0 fL Final    Platelets 11/13/2023 311  140 - 450 10*3/mm3 Final    Neutrophil % 11/13/2023 88.9 (H)  42.7 - 76.0 % Final    Lymphocyte % 11/13/2023 4.9 (L)  19.6 - 45.3 % Final    Monocyte % 11/13/2023 5.7  5.0 - 12.0 % Final    Eosinophil % 11/13/2023 0.0 (L)  0.3 - 6.2 % Final    Basophil % 11/13/2023 0.2  0.0 - 1.5 % Final    Immature Grans % 11/13/2023 0.3  0.0 - 0.5 % Final    Neutrophils, Absolute 11/13/2023 15.08 (H)  1.70 - 7.00 10*3/mm3 Final    Lymphocytes, Absolute 11/13/2023 0.83  0.70 - 3.10 10*3/mm3 Final    Monocytes, Absolute 11/13/2023 0.97 (H)  0.10 - 0.90 10*3/mm3 Final    Eosinophils, Absolute 11/13/2023 0.00  0.00 - 0.40 10*3/mm3 Final    Basophils, Absolute 11/13/2023 0.03  0.00 - 0.20 10*3/mm3 Final    Immature Grans, Absolute 11/13/2023 0.05  0.00 - 0.05 10*3/mm3 Final    nRBC 11/13/2023 0.0  0.0 - 0.2 /100 WBC Final    COVID19 11/13/2023 Not Detected  Not Detected - Ref. Range Final    Influenza A PCR 11/13/2023 Not Detected   Not Detected Final    Influenza B PCR 11/13/2023 Not Detected  Not Detected Final    THC, Screen, Urine 11/14/2023 Positive (A)  Negative Final    Phencyclidine (PCP), Urine 11/14/2023 Negative  Negative Final    Cocaine Screen, Urine 11/14/2023 Negative  Negative Final    Methamphetamine, Ur 11/14/2023 Negative  Negative Final    Opiate Screen 11/14/2023 Negative  Negative Final    Amphetamine Screen, Urine 11/14/2023 Negative  Negative Final    Benzodiazepine Screen, Urine 11/14/2023 Negative  Negative Final    Tricyclic Antidepressants Screen 11/14/2023 Negative  Negative Final    Methadone Screen, Urine 11/14/2023 Negative  Negative Final    Barbiturates Screen, Urine 11/14/2023 Negative  Negative Final    Oxycodone Screen, Urine 11/14/2023 Negative  Negative Final    Buprenorphine, Screen, Urine 11/14/2023 Negative  Negative Final    Color, UA 11/14/2023 Yellow  Yellow, Straw Final    Appearance, UA 11/14/2023 Cloudy (A)  Clear Final    pH, UA 11/14/2023 5.5  5.0 - 8.0 Final    Specific Gravity, UA 11/14/2023 >=1.030  1.005 - 1.030 Final    Glucose, UA 11/14/2023 100 mg/dL (Trace) (A)  Negative Final    Ketones, UA 11/14/2023 40 mg/dL (2+) (A)  Negative Final    Bilirubin, UA 11/14/2023 Negative  Negative Final    Blood, UA 11/14/2023 Large (3+) (A)  Negative Final    Protein, UA 11/14/2023 >=300 mg/dL (3+) (A)  Negative Final    Leuk Esterase, UA 11/14/2023 Negative  Negative Final    Nitrite, UA 11/14/2023 Negative  Negative Final    Urobilinogen, UA 11/14/2023 0.2 E.U./dL  0.2 - 1.0 E.U./dL Final    HS Troponin T 11/14/2023 44 (H)  <22 ng/L Final    Troponin T Delta 11/14/2023 -2  >=-4 - <+4 ng/L Final    Fentanyl, Urine 11/14/2023 Negative  Negative Final    RBC, UA 11/14/2023 11-20 (A)  None Seen, 0-2 /HPF Final    WBC, UA 11/14/2023 0-2  None Seen, 0-2 /HPF Final    Bacteria, UA 11/14/2023 Trace (A)  None Seen /HPF Final    Squamous Epithelial Cells, UA 11/14/2023 3-6 (A)  None Seen, 0-2 /HPF Final     Hyaline Casts, UA 11/14/2023 21-30  None Seen /LPF Final    Methodology 11/14/2023 Manual Light Microscopy   Final    Site 11/14/2023 Right Brachial   Final    Dell's Test 11/14/2023 N/A   Final    pH, Arterial 11/14/2023 7.581 (C)  7.350 - 7.450 pH units Final    86 Value above critical limit    pCO2, Arterial 11/14/2023 25.0 (L)  35.0 - 45.0 mm Hg Final    84 Value below reference range    pO2, Arterial 11/14/2023 94.5  83.0 - 108.0 mm Hg Final    HCO3, Arterial 11/14/2023 23.5  20.0 - 26.0 mmol/L Final    Base Excess, Arterial 11/14/2023 3.7 (H)  0.0 - 2.0 mmol/L Final    O2 Saturation, Arterial 11/14/2023 97.8  94.0 - 99.0 % Final    Hemoglobin, Blood Gas 11/14/2023 19.6 (H)  14 - 18 g/dL Final    83 Value above reference range    Hematocrit, Blood Gas 11/14/2023 60.0 (H)  38.0 - 51.0 % Final    83 Value above reference range    Oxyhemoglobin 11/14/2023 97.9  94 - 99 % Final    Methemoglobin 11/14/2023 <-0.10 (L)  0.00 - 3.00 % Final    94 Value below reportable range < _0.1    Carboxyhemoglobin 11/14/2023 0.4  0 - 5 % Final    A-a DO2 11/14/2023 21.8  0.0 - 300.0 mmHg Final    CO2 Content 11/14/2023 24.3  22 - 33 mmol/L Final    Barometric Pressure for Blood Gas 11/14/2023 735  mmHg Final    Modality 11/14/2023 Room Air   Final    FIO2 11/14/2023 21  % Final    Ventilator Mode 11/14/2023 NA   Final    Note 11/14/2023 Read back and acknowledge   Final    Notified Who 11/14/2023 DR LLAMAS // RN   Final    Notified By 11/14/2023 [id number]   Final    Notified Time 11/14/2023 11/14/2023 01:43   Final    Collected by 11/14/2023 [id number]   Final    Meter: [id number]     :  [id number]   Office Visit on 10/27/2023   Component Date Value Ref Range Status    Color 10/27/2023 Yellow  Yellow, Straw, Dark Yellow, Sandra Final    Clarity, UA 10/27/2023 Clear  Clear Final    Specific Gravity  10/27/2023 1.025  1.005 - 1.030 Final    pH, Urine 10/27/2023 5.0  5.0 - 8.0 Final    Leukocytes 10/27/2023 Negative   Negative Final    Nitrite, UA 10/27/2023 Negative  Negative Final    Protein, POC 10/27/2023 Trace (A)  Negative mg/dL Final    Glucose, UA 10/27/2023 Negative  Negative mg/dL Final    Ketones, UA 10/27/2023 Negative  Negative Final    Urobilinogen, UA 10/27/2023 Normal  Normal, 0.2 E.U./dL Final    Bilirubin 10/27/2023 Negative  Negative Final    Blood, UA 10/27/2023 Negative  Negative Final    Lot Number 10/27/2023 [id number]   Final    Expiration Date 10/27/2023 10/5/24   Final    PSA 10/27/2023 14.0 (H)  0.0 - 4.0 ng/mL Final    Roche ECLIA methodology.  According to the American Urological Association, Serum PSA should  decrease and remain at undetectable levels after radical  prostatectomy. The AUA defines biochemical recurrence as an initial  PSA value 0.2 ng/mL or greater followed by a subsequent confirmatory  PSA value 0.2 ng/mL or greater.  Values obtained with different assay methods or kits cannot be used  interchangeably. Results cannot be interpreted as absolute evidence  of the presence or absence of malignant disease.    PSA, Free 10/27/2023 1.69  N/A ng/mL Final    Roche ECLIA methodology.    PSA, Free % 10/27/2023 12.1  % Final    The table below lists the probability of prostate cancer for  men with non-suspicious AMBER results and total PSA between  4 and 10 ng/mL, by patient age (Ania et al, VONDA 1998,  279:1542).                    % Free PSA       50-64 yr        65-75 yr                    0.00-10.00%        56%             55%                   10.01-15.00%        24%             35%                   15.01-20.00%        17%             23%                   20.01-25.00%        10%             20%                        >25.00%         5%              9%  Please note:  Ania et al did not make specific                recommendations regarding the use of                percent free PSA for any other population                of men.        Procedure:       Assessment/Plan:   Prostate  cancer:  He returns today status post biopsy for extensive discussion of prostate cancer.  We discussed staging and grading of the disease.  I described with a [place] system being from a 2 to10 scale with the most common low-grade pattern being a [place] 6.  I discussed the staging workup including a total body bone scan and CT scan especially with a PSA greater than 10.  We discussed the various options at length. I discussed a radical retropubic prostatectomy done in the traditional fashion and using the robotic technique.  I then discussed radiation treatment both seed therapy and external beam therapy using Gold fiduciary markers.  We talked about some of the other alternatives such as a cryosurgical ablation at high intensity focused ultrasound as being viable alternatives but not recommended at this time.  He focused on aggressive watchful waiting and explained that currently low literature it's been discovered that a lot of men can actually observe it especially with numerous other comorbidities cannot have problems from the cancer by itself.  Talked about hormonal ablation and the side effects of creation of insulin resistance.  Overall, the patient was given appropriate literature, is going to think about it, and I'm going to revisit the topic with them after the next office visit.  I am going to initiate the staging workup with CT total body bone scan in preparation for probable CyberKnife therapy  Agent orange exposure - this is a dioxin-based pesticide that was used extensively in [place] in 1968 through the early 70s.  Exposure to this has been well-documented to significantly increase certain malignancies, particularly prostate cancer, bladder cancer, and renal cancer.  Any exposure of this with the appropriate history should be followed with PSAs.            This document has been electronically signed by ALTHEA RESENDEZ MD December 28, 2023 09:55 EST    Dictated Utilizing Dragon Dictation: Part  of this note may be an electronic transcription/translation of spoken language to printed text using the Dragon Dictation System.

## 2023-12-29 PROBLEM — C61 PROSTATE CANCER: Status: ACTIVE | Noted: 2023-12-29

## 2024-01-12 ENCOUNTER — HOSPITAL ENCOUNTER (OUTPATIENT)
Dept: CT IMAGING | Facility: HOSPITAL | Age: 74
Discharge: HOME | End: 2024-01-12
Admitting: UROLOGY
Payer: OTHER GOVERNMENT

## 2024-01-12 DIAGNOSIS — C61 PROSTATE CANCER: ICD-10-CM

## 2024-01-12 LAB — CREAT BLDA-MCNC: 0.9 MG/DL (ref 0.6–1.3)

## 2024-01-12 PROCEDURE — 82565 ASSAY OF CREATININE: CPT

## 2024-01-12 PROCEDURE — 74178 CT ABD&PLV WO CNTR FLWD CNTR: CPT

## 2024-01-12 PROCEDURE — 25510000001 IOPAMIDOL 61 % SOLUTION: Performed by: UROLOGY

## 2024-01-12 RX ADMIN — IOPAMIDOL 100 ML: 612 INJECTION, SOLUTION INTRAVENOUS at 13:41

## 2024-01-23 ENCOUNTER — TELEPHONE (OUTPATIENT)
Dept: UROLOGY | Facility: CLINIC | Age: 74
End: 2024-01-23
Payer: MEDICARE

## 2024-01-23 ENCOUNTER — HOSPITAL ENCOUNTER (OUTPATIENT)
Dept: NUCLEAR MEDICINE | Facility: HOSPITAL | Age: 74
Discharge: HOME | End: 2024-01-23
Payer: MEDICARE

## 2024-01-23 DIAGNOSIS — C61 PROSTATE CANCER: ICD-10-CM

## 2024-01-23 PROCEDURE — A9561 TC99M OXIDRONATE: HCPCS | Performed by: UROLOGY

## 2024-01-23 PROCEDURE — 0 TECHNETIUM OXIDRONATE KIT: Performed by: UROLOGY

## 2024-01-23 PROCEDURE — 78306 BONE IMAGING WHOLE BODY: CPT

## 2024-01-23 PROCEDURE — 78306 BONE IMAGING WHOLE BODY: CPT | Performed by: RADIOLOGY

## 2024-01-23 RX ADMIN — TECHNETIUM TC 99M OXIDRONATE 1 DOSE: 3.15 INJECTION, POWDER, LYOPHILIZED, FOR SOLUTION INTRAVENOUS at 10:52

## 2024-01-23 NOTE — TELEPHONE ENCOUNTER
I have FMLA paperwork for the pt's daughter that need to be filled out due to her being the caretaker of her parents. I called and left a vm to see what all she needed to be filled out to call me back.

## 2024-01-30 ENCOUNTER — OFFICE VISIT (OUTPATIENT)
Dept: UROLOGY | Facility: CLINIC | Age: 74
End: 2024-01-30
Payer: OTHER GOVERNMENT

## 2024-01-30 VITALS
HEIGHT: 72 IN | SYSTOLIC BLOOD PRESSURE: 153 MMHG | HEART RATE: 98 BPM | WEIGHT: 164.6 LBS | DIASTOLIC BLOOD PRESSURE: 91 MMHG | BODY MASS INDEX: 22.29 KG/M2

## 2024-01-30 DIAGNOSIS — R97.20 ELEVATED PSA, BETWEEN 10 AND LESS THAN 20 NG/ML: Primary | ICD-10-CM

## 2024-01-30 DIAGNOSIS — C61 PROSTATE CANCER: ICD-10-CM

## 2024-01-30 LAB — PSA SERPL-MCNC: 18.4 NG/ML (ref 0–4)

## 2024-01-30 PROCEDURE — 99213 OFFICE O/P EST LOW 20 MIN: CPT | Performed by: UROLOGY

## 2024-01-30 PROCEDURE — 84153 ASSAY OF PSA TOTAL: CPT | Performed by: UROLOGY

## 2024-01-30 NOTE — PROGRESS NOTES
Chief Complaint:      Chief Complaint   Patient presents with    Prostate Cancer       HPI:   73 y.o. male He has a positive biopsy for T2c disease Chelo's 6 and 7.  We discussed his options at great length he has heavy agent orange exposure in [place] in 1968.  I would initiate a staging workup with CT total body bone scan and an Oncotype examination I will follow-up with him based this in 3 weeks we will discuss fine-tuning at [facility] would [facility] he has had a negative total body bone scan heavy exposure to agent orange low back pain in the form of a sciatica type picture he PSA was 14 and no Lupron was given per radiation.  I would repeat today if he continues to rise he will need a Lupron injection.  Will call him as soon as it is available.    Past Medical History:     Past Medical History:   Diagnosis Date    Hyperlipidemia     Hypertension     Prostatic enlargement        Current Meds:     Current Outpatient Medications   Medication Sig Dispense Refill    ciprofloxacin (Cipro) 500 MG tablet Take one tablet twice a day before procedure 4 tablet 0    diazePAM (VALIUM) 10 MG tablet One tablet night before procedure at bedtime and one morning of procedure 2 tablet 0    diphenoxylate-atropine (LOMOTIL) 2.5-0.025 MG per tablet Take 1 tablet by mouth 4 (Four) Times a Day As Needed for Diarrhea. 6 tablet 0    finasteride (PROSCAR) 5 MG tablet Take 1 tablet by mouth Daily. 30 tablet 5    gabapentin (NEURONTIN) 300 MG capsule Take 1 capsule by mouth 2 (Two) Times a Day.      HYDROcodone-acetaminophen (NORCO) 5-325 MG per tablet Take 1 tablet by mouth Every 8 (Eight) Hours As Needed for Severe Pain. 9 tablet 0    Mirabegron ER (MYRBETRIQ) 25 MG tablet sustained-release 24 hour 24 hr tablet Take 1 tablet by mouth Daily. 30 tablet 0    phenazopyridine (Pyridium) 200 MG tablet Take 1 tablet by mouth 3 (Three) Times a Day As Needed for Bladder Spasms. 20 tablet 0     pravastatin (PRAVACHOL) 40 MG tablet Take 1 tablet by mouth Daily.      tamsulosin (FLOMAX) 0.4 MG capsule 24 hr capsule Take 1 capsule by mouth Every Night.      traZODone (DESYREL) 50 MG tablet Take 1 tablet by mouth Every Night.       No current facility-administered medications for this visit.        Allergies:      No Known Allergies     Past Surgical History:     Past Surgical History:   Procedure Laterality Date    BACK SURGERY         Social History:     Social History     Socioeconomic History    Marital status:    Tobacco Use    Smoking status: Never    Smokeless tobacco: Never   Vaping Use    Vaping Use: Never used   Substance and Sexual Activity    Alcohol use: Yes     Comment: occasional    Drug use: No    Sexual activity: Never       Family History:     No family history on file.    Review of Systems:     Review of Systems   Constitutional: Negative.    HENT: Negative.     Eyes: Negative.    Respiratory: Negative.     Cardiovascular: Negative.    Gastrointestinal: Negative.    Endocrine: Negative.    Musculoskeletal: Negative.    Allergic/Immunologic: Negative.    Neurological: Negative.    Hematological: Negative.    Psychiatric/Behavioral: Negative.         Physical Exam:     Physical Exam  Vitals and nursing note reviewed.   Constitutional:       Appearance: He is well-developed.   HENT:      Head: Normocephalic and atraumatic.   Eyes:      Conjunctiva/sclera: Conjunctivae normal.      Pupils: Pupils are equal, round, and reactive to light.   Cardiovascular:      Rate and Rhythm: Normal rate and regular rhythm.      Heart sounds: Normal heart sounds.   Pulmonary:      Effort: Pulmonary effort is normal.      Breath sounds: Normal breath sounds.   Abdominal:      General: Bowel sounds are normal.      Palpations: Abdomen is soft.   Musculoskeletal:         General: Normal range of motion.      Cervical back: Normal range of motion.   Skin:     General: Skin is warm and dry.   Neurological:       Mental Status: He is alert and oriented to person, place, and time.      Deep Tendon Reflexes: Reflexes are normal and symmetric.   Psychiatric:         Behavior: Behavior normal.         Thought Content: Thought content normal.         Judgment: Judgment normal.         I have reviewed the following portions of the patient's history: Allergies, current medications, past family history, past medical history, past social history, past surgical history, problem list, and ROS and confirm it is accurate.    Recent Image (CT and/or KUB):      CT Abdomen and Pelvis: Results for orders placed during the hospital encounter of 01/12/24    CT Abdomen Pelvis With & Without Contrast    Narrative  PROCEDURE: CT ABDOMEN PELVIS W WO CONTRAST-    HISTORY: Prostate cancer, high risk, staging; Y45-Czkwieedb neoplasm of  prostate    COMPARISON: 11/14/2023.    TECHNIQUE: Multiple axial CT images were obtained from the lung bases  through the pubic symphysis before and following the administration of  Isovue-300. . This study was performed with techniques to keep radiation  doses as low as reasonably achievable (ALARA). Individualized dose  reduction techniques using automated exposure control or adjustment of  mA and/or kV according to the patient size were employed.    FINDINGS:    ABDOMEN: The lung bases are clear are clear. The heart is normal in  size. The liver is normal. The spleen is unremarkable. No adrenal mass  is present. There is prominence of the pancreatic duct in the head and  neck of the pancreas unchanged compared to the prior exam. No focal  pancreatic lesions are identified. Precontrast images reveal no evidence  of nephrolithiasis. The kidneys enhance appropriately. There is no  evidence of a cystic or solid renal mass. The aorta is normal in  caliber. The mesenteric vasculature is patent. There is no free fluid or  adenopathy. The small bowel is unremarkable with no evidence of  obstruction.    PELVIS: The  appendix is normal. There are colonic diverticula without  evidence of diverticulitis. The prostate gland is enlarged. The urinary  bladder is unremarkable. There is no significant free fluid or  adenopathy. Bone windows reveal no lytic or destructive lesions. There  are degenerative changes within the lumbar spine with grade 1  anterolisthesis of L4 on L5.    .    Impression  No evidence of metastatic disease within the abdomen or  pelvis.      This report was finalized on 2024 1:49 PM by Jewels Kennedy M.D..       CT Stone Protocol: No results found for this or any previous visit.       KUB: No results found for this or any previous visit.       Labs (past 3 months):      Hospital Outpatient Visit on 2024   Component Date Value Ref Range Status    Creatinine 2024 0.90  0.60 - 1.30 mg/dL Final    Serial Number: [id number]:  [id number]   Procedure visit on 2023   Component Date Value Ref Range Status    Reference Lab Report 2023    Final                    Value:[facility]  [address] [place] [address]  Phone: [phone] or [phone]  Fax: [phone]  Dajuan Lorenzo M.D., Medical Director    PATIENT NAME                                     LABORATORY NO.  FRANCESCO PEREZ                                 [id number]  [id number]                                 AGE                    SEX   SSN              CLIENT REF #  [facility] ANJALI              72        1950      LILIAM     [id number]      [id number]    [address] [facility] [address]                    REQUESTING M.D.           ATTENDING [place] [address]                           ALTHEA RESENDEZ  DATE COLLECTED            DATE RECEIVED          DATE REPORTED  2023    DIAGNOSIS:  A.     PROSTATE, NEEDLE CORE BIOPSY, RIGHT APEX:  Prostatic acinar adenocarcinoma, grade group 1 ([place]  3+3), 4 mm extent  B.     PROSTATE, NEEDLE CORE BIOPSY, RIGHT                           MID:  Prostatic acinar adenocarcinoma, grade group 2 ([place] 3+4), 9 mm extent  C.     PROSTATE, NEEDLE CORE BIOPSY, RIGHT BASE:  Prostatic acinar adenocarcinoma, grade group 1 ([place]hel[place] 3+3), 3 mm extent  D.     PROSTATE, NEEDLE CORE BIOPSY, RIGHT LATERAL MID:  Prostatic acinar adenocarcinoma, grade group 2 ([place]hel[place] 3+4), 8 mm  extent, with focal suggestion of perineural invasion  E.     PROSTATE, NEEDLE CORE BIOPSY, LEFT APEX:  Benign prostatic tissue  F.     PROSTATE, NEEDLE CORE BIOPSY, LEFT MID:  Prostatic acinar adenocarcinoma, grade group 3 ([place] 4+3), 7 mm  extent, with focal perineural invasion  G.     PROSTATE, NEEDLE CORE BIOPSY, LEFT BASE:  Prostatic acinar adenocarcinoma, grade group 3 ([place] 4+3), 1 mm extent  H.     PROSTATE, NEEDLE CORE BIOPSY, LEFT LATERAL BASE:  Prostatic acinar adenocarcinoma, grade group 2 ([place] 3+4), 10 mm  extent, with focal perineural invasion    CAM    CLINICAL HISTORY:  Elevated prostate specific antigen (PSA)    SPECIMENS RECEIVED:  A.                              PROSTATE, NEEDLE CORE BIOPSY, RIGHT APEX  B.    PROSTATE, NEEDLE CORE BIOPSY, RIGHT MID  C.    PROSTATE, NEEDLE CORE BIOPSY, RIGHT BASE  D.    PROSTATE, NEEDLE CORE BIOPSY, RIGHT LATERAL MID  E.    PROSTATE, NEEDLE CORE BIOPSY, LEFT APEX  F.    PROSTATE, NEEDLE CORE BIOPSY, LEFT MID  G.    PROSTATE, NEEDLE CORE BIOPSY, LEFT BASE  H.    PROSTATE, NEEDLE CORE BIOPSY, LEFT LATERAL BASE    MICROSCOPIC DESCRIPTION:  Tissue blocks are prepared and slides are examined microscopically on all  specimens. See diagnosis for details.    Professional interpretation rendered by Marianna Jiang M.D., F.C.A.P. at  [employer] [employer], [address] [place] [id number].    GROSS DESCRIPTION:  A.    Labeled right apex is a 0.7 x 0.1 x 0.1 cm tan needle core which is  submitted entirely in 1 block.  BW  B.    Labeled right mid is a 1.3 x 0.1  x 0.1 cm tan needle core which is  submitted entirely 1 block.  C.    Labeled right base is a 1.0 x 0.1 x 0.1 cm tan needle core which is  submitted entirely in 1                           block.  D.    Labeled right lateral mid is a 1.0 x 0.1 x 0.1 cm tan needle core which is  submitted entirely in 1 block.  E.    Labeled left mid is a 1.0 x 0.1 x 0.1 cm tan needle core which is submitted  entirely in 1 block.  F.    Labeled left base is a 1.0 x 0.1 x 0.1 cm tan needle core which is submitted  entirely in 1 block.  G.    Labeled left lateral mid is a 1.0 x 0.1 x 0.1 cm tan needle core which is  submitted entirely in 1 block.  H.    Labeled left lateral base is a 1.0 x 0.1 x 0.1 cm tan needle core which is  submitted entirely 1 block.    REVIEWED, DIAGNOSED AND ELECTRONICALLY  SIGNED BY:    Marianna Jiang M.D., F.C.A.P.  CPT CODES:  88305x8     Admission on 11/13/2023, Discharged on 11/14/2023   Component Date Value Ref Range Status    QT Interval 11/13/2023 346  ms Final    QTC Interval 11/13/2023 465  ms Final    Glucose 11/13/2023 179 (H)  65 - 99 mg/dL Final    BUN 11/13/2023 15  8 - 23 mg/dL Final    Creatinine 11/13/2023 1.04  0.76 - 1.27 mg/dL Final    Sodium 11/13/2023 138  136 - 145 mmol/L Final    Potassium 11/13/2023 4.4  3.5 - 5.2 mmol/L Final    Slight hemolysis detected by analyzer. Result may be falsely elevated.    Chloride 11/13/2023 96 (L)  98 - 107 mmol/L Final    CO2 11/13/2023 18.0 (L)  22.0 - 29.0 mmol/L Final    Calcium 11/13/2023 10.6 (H)  8.6 - 10.5 mg/dL Final    Total Protein 11/13/2023 8.4  6.0 - 8.5 g/dL Final    Albumin 11/13/2023 5.3 (H)  3.5 - 5.2 g/dL Final    ALT (SGPT) 11/13/2023 13  1 - 41 U/L Final    AST (SGOT) 11/13/2023 23  1 - 40 U/L Final    Alkaline Phosphatase 11/13/2023 117  39 - 117 U/L Final    Total Bilirubin 11/13/2023 0.7  0.0 - 1.2 mg/dL Final    Globulin 11/13/2023 3.1  gm/dL Final    A/G Ratio 11/13/2023 1.7  g/dL Final    BUN/Creatinine Ratio 11/13/2023 14.4   7.0 - 25.0 Final    Anion Gap 11/13/2023 24.0 (H)  5.0 - 15.0 mmol/L Final    eGFR 11/13/2023 75.3  >60.0 mL/min/1.73 Final    HS Troponin T 11/13/2023 46 (H)  <22 ng/L Final    Extra Tube 11/13/2023 Hold for add-ons.   Final    Auto resulted.    Extra Tube 11/13/2023 hold for add-on   Final    Auto resulted    Extra Tube 11/13/2023 Hold for add-ons.   Final    Auto resulted.    Extra Tube 11/13/2023 Hold for add-ons.   Final    Auto resulted    WBC 11/13/2023 16.96 (H)  3.40 - 10.80 10*3/mm3 Final    RBC 11/13/2023 6.33 (H)  4.14 - 5.80 10*6/mm3 Final    Hemoglobin 11/13/2023 19.9 (H)  13.0 - 17.7 g/dL Final    Hematocrit 11/13/2023 59.1 (H)  37.5 - 51.0 % Final    MCV 11/13/2023 93.4  79.0 - 97.0 fL Final    MCH 11/13/2023 31.4  26.6 - 33.0 pg Final    MCHC 11/13/2023 33.7  31.5 - 35.7 g/dL Final    RDW 11/13/2023 13.2  12.3 - 15.4 % Final    RDW-SD 11/13/2023 45.0  37.0 - 54.0 fl Final    MPV 11/13/2023 9.7  6.0 - 12.0 fL Final    Platelets 11/13/2023 311  140 - 450 10*3/mm3 Final    Neutrophil % 11/13/2023 88.9 (H)  42.7 - 76.0 % Final    Lymphocyte % 11/13/2023 4.9 (L)  19.6 - 45.3 % Final    Monocyte % 11/13/2023 5.7  5.0 - 12.0 % Final    Eosinophil % 11/13/2023 0.0 (L)  0.3 - 6.2 % Final    Basophil % 11/13/2023 0.2  0.0 - 1.5 % Final    Immature Grans % 11/13/2023 0.3  0.0 - 0.5 % Final    Neutrophils, Absolute 11/13/2023 15.08 (H)  1.70 - 7.00 10*3/mm3 Final    Lymphocytes, Absolute 11/13/2023 0.83  0.70 - 3.10 10*3/mm3 Final    Monocytes, Absolute 11/13/2023 0.97 (H)  0.10 - 0.90 10*3/mm3 Final    Eosinophils, Absolute 11/13/2023 0.00  0.00 - 0.40 10*3/mm3 Final    Basophils, Absolute 11/13/2023 0.03  0.00 - 0.20 10*3/mm3 Final    Immature Grans, Absolute 11/13/2023 0.05  0.00 - 0.05 10*3/mm3 Final    nRBC 11/13/2023 0.0  0.0 - 0.2 /100 WBC Final    COVID19 11/13/2023 Not Detected  Not Detected - Ref. Range Final    Influenza A PCR 11/13/2023 Not Detected  Not Detected Final    Influenza B PCR 11/13/2023  Not Detected  Not Detected Final    THC, Screen, Urine 11/14/2023 Positive (A)  Negative Final    Phencyclidine (PCP), Urine 11/14/2023 Negative  Negative Final    Cocaine Screen, Urine 11/14/2023 Negative  Negative Final    Methamphetamine, Ur 11/14/2023 Negative  Negative Final    Opiate Screen 11/14/2023 Negative  Negative Final    Amphetamine Screen, Urine 11/14/2023 Negative  Negative Final    Benzodiazepine Screen, Urine 11/14/2023 Negative  Negative Final    Tricyclic Antidepressants Screen 11/14/2023 Negative  Negative Final    Methadone Screen, Urine 11/14/2023 Negative  Negative Final    Barbiturates Screen, Urine 11/14/2023 Negative  Negative Final    Oxycodone Screen, Urine 11/14/2023 Negative  Negative Final    Buprenorphine, Screen, Urine 11/14/2023 Negative  Negative Final    Color, UA 11/14/2023 Yellow  Yellow, Straw Final    Appearance, UA 11/14/2023 Cloudy (A)  Clear Final    pH, UA 11/14/2023 5.5  5.0 - 8.0 Final    Specific Gravity, UA 11/14/2023 >=1.030  1.005 - 1.030 Final    Glucose, UA 11/14/2023 100 mg/dL (Trace) (A)  Negative Final    Ketones, UA 11/14/2023 40 mg/dL (2+) (A)  Negative Final    Bilirubin, UA 11/14/2023 Negative  Negative Final    Blood, UA 11/14/2023 Large (3+) (A)  Negative Final    Protein, UA 11/14/2023 >=300 mg/dL (3+) (A)  Negative Final    Leuk Esterase, UA 11/14/2023 Negative  Negative Final    Nitrite, UA 11/14/2023 Negative  Negative Final    Urobilinogen, UA 11/14/2023 0.2 E.U./dL  0.2 - 1.0 E.U./dL Final    HS Troponin T 11/14/2023 44 (H)  <22 ng/L Final    Troponin T Delta 11/14/2023 -2  >=-4 - <+4 ng/L Final    Fentanyl, Urine 11/14/2023 Negative  Negative Final    RBC, UA 11/14/2023 11-20 (A)  None Seen, 0-2 /HPF Final    WBC, UA 11/14/2023 0-2  None Seen, 0-2 /HPF Final    Bacteria, UA 11/14/2023 Trace (A)  None Seen /HPF Final    Squamous Epithelial Cells, UA 11/14/2023 3-6 (A)  None Seen, 0-2 /HPF Final    Hyaline Casts, UA 11/14/2023 21-30  None Seen /LPF  Final    Methodology 11/14/2023 Manual Light Microscopy   Final    Site 11/14/2023 Right Brachial   Final    Dell's Test 11/14/2023 N/A   Final    pH, Arterial 11/14/2023 7.581 (C)  7.350 - 7.450 pH units Final    86 Value above critical limit    pCO2, Arterial 11/14/2023 25.0 (L)  35.0 - 45.0 mm Hg Final    84 Value below reference range    pO2, Arterial 11/14/2023 94.5  83.0 - 108.0 mm Hg Final    HCO3, Arterial 11/14/2023 23.5  20.0 - 26.0 mmol/L Final    Base Excess, Arterial 11/14/2023 3.7 (H)  0.0 - 2.0 mmol/L Final    O2 Saturation, Arterial 11/14/2023 97.8  94.0 - 99.0 % Final    Hemoglobin, Blood Gas 11/14/2023 19.6 (H)  14 - 18 g/dL Final    83 Value above reference range    Hematocrit, Blood Gas 11/14/2023 60.0 (H)  38.0 - 51.0 % Final    83 Value above reference range    Oxyhemoglobin 11/14/2023 97.9  94 - 99 % Final    Methemoglobin 11/14/2023 <-0.10 (L)  0.00 - 3.00 % Final    94 Value below reportable range < _0.1    Carboxyhemoglobin 11/14/2023 0.4  0 - 5 % Final    A-a DO2 11/14/2023 21.8  0.0 - 300.0 mmHg Final    CO2 Content 11/14/2023 24.3  22 - 33 mmol/L Final    Barometric Pressure for Blood Gas 11/14/2023 735  mmHg Final    Modality 11/14/2023 Room Air   Final    FIO2 11/14/2023 21  % Final    Ventilator Mode 11/14/2023 NA   Final    Note 11/14/2023 Read back and acknowledge   Final    Notified Who 11/14/2023 DR LLAMAS // RN   Final    Notified By 11/14/2023 201539   Final    Notified Time 11/14/2023 11/14/2023 01:43   Final    Collected by 11/14/2023 201539   Final    Meter: [id number]     :  [id number]        Procedure:       Assessment/Plan:   Prostate cancer:  He returns today status post biopsy for extensive discussion of prostate cancer.  We discussed staging and grading of the disease.  I described with a [place] system being from a 2 to10 scale with the most common low-grade pattern being a Chelo 6.  I discussed the staging workup including a total body bone scan and  CT scan especially with a PSA greater than 10.  We discussed the various options at length. I discussed a radical retropubic prostatectomy done in the traditional fashion and using the robotic technique.  I then discussed radiation treatment both seed therapy and external beam therapy using Gold fiduciary markers.  We talked about some of the other alternatives such as a cryosurgical ablation at high intensity focused ultrasound as being viable alternatives but not recommended at this time.  He focused on aggressive watchful waiting and explained that currently low literature it's been discovered that a lot of men can actually observe it especially with numerous other comorbidities cannot have problems from the cancer by itself.  Talked about hormonal ablation and the side effects of creation of insulin resistance.  Overall, the patient was given appropriate literature, is going to think about it, and I'm going to revisit the topic with them after the next office visit.  If his PSA goes up he will need a Lupron injection  Agent orange exposure - this is a dioxin-based pesticide that was used extensively in [place] in 1968 through the early 70s.  Exposure to this has been well-documented to significantly increase certain malignancies, particularly prostate cancer, bladder cancer, and renal cancer.  Any exposure of this with the appropriate history should be followed with PSAs.        This document has been electronically signed by ALTHEA RESENDEZ MD January 30, 2024 09:20 EST    Dictated Utilizing Dragon Dictation: Part of this note may be an electronic transcription/translation of spoken language to printed text using the Dragon Dictation System.

## 2024-02-06 ENCOUNTER — TELEPHONE (OUTPATIENT)
Dept: UROLOGY | Facility: CLINIC | Age: 74
End: 2024-02-06
Payer: MEDICARE

## 2024-02-12 ENCOUNTER — OFFICE VISIT (OUTPATIENT)
Dept: UROLOGY | Facility: CLINIC | Age: 74
End: 2024-02-12
Payer: OTHER GOVERNMENT

## 2024-02-12 VITALS
SYSTOLIC BLOOD PRESSURE: 116 MMHG | HEART RATE: 82 BPM | WEIGHT: 171 LBS | BODY MASS INDEX: 23.16 KG/M2 | DIASTOLIC BLOOD PRESSURE: 71 MMHG | HEIGHT: 72 IN

## 2024-02-12 DIAGNOSIS — C61 PROSTATE CANCER: Primary | ICD-10-CM

## 2024-02-12 LAB — REF LAB TEST METHOD: NORMAL

## 2024-02-12 NOTE — PROGRESS NOTES
Chief Complaint:      Chief Complaint   Patient presents with    Prostate Cancer       HPI:   73 y.o. male returns once to set up radiation he had we discussed the situation at great length.  There is heavy exposure to agent orange.  His CT and bone scan were negative.  His Oncotype is 55  Past Medical History:     Past Medical History:   Diagnosis Date    Hyperlipidemia     Hypertension     Prostatic enlargement        Current Meds:     Current Outpatient Medications   Medication Sig Dispense Refill    ciprofloxacin (Cipro) 500 MG tablet Take one tablet twice a day before procedure 4 tablet 0    diazePAM (VALIUM) 10 MG tablet One tablet night before procedure at bedtime and one morning of procedure 2 tablet 0    diphenoxylate-atropine (LOMOTIL) 2.5-0.025 MG per tablet Take 1 tablet by mouth 4 (Four) Times a Day As Needed for Diarrhea. 6 tablet 0    finasteride (PROSCAR) 5 MG tablet Take 1 tablet by mouth Daily. 30 tablet 5    gabapentin (NEURONTIN) 300 MG capsule Take 1 capsule by mouth 2 (Two) Times a Day.      HYDROcodone-acetaminophen (NORCO) 5-325 MG per tablet Take 1 tablet by mouth Every 8 (Eight) Hours As Needed for Severe Pain. 9 tablet 0    Mirabegron ER (MYRBETRIQ) 25 MG tablet sustained-release 24 hour 24 hr tablet Take 1 tablet by mouth Daily. 30 tablet 0    phenazopyridine (Pyridium) 200 MG tablet Take 1 tablet by mouth 3 (Three) Times a Day As Needed for Bladder Spasms. 20 tablet 0    pravastatin (PRAVACHOL) 40 MG tablet Take 1 tablet by mouth Daily.      tamsulosin (FLOMAX) 0.4 MG capsule 24 hr capsule Take 1 capsule by mouth Every Night.      traZODone (DESYREL) 50 MG tablet Take 1 tablet by mouth Every Night.       No current facility-administered medications for this visit.        Allergies:      No Known Allergies     Past Surgical History:     Past Surgical History:   Procedure Laterality Date    BACK SURGERY         Social History:     Social History     Socioeconomic History    Marital status:     Tobacco Use    Smoking status: Never    Smokeless tobacco: Never   Vaping Use    Vaping Use: Never used   Substance and Sexual Activity    Alcohol use: Yes     Comment: occasional    Drug use: No    Sexual activity: Never       Family History:     No family history on file.    Review of Systems:     Review of Systems   Constitutional: Negative.    HENT: Negative.     Eyes: Negative.    Respiratory: Negative.     Cardiovascular: Negative.    Gastrointestinal: Negative.    Endocrine: Negative.    Musculoskeletal: Negative.    Allergic/Immunologic: Negative.    Neurological: Negative.    Hematological: Negative.    Psychiatric/Behavioral: Negative.         Physical Exam:     Physical Exam  Vitals and nursing note reviewed.   Constitutional:       Appearance: He is well-developed.   HENT:      Head: Normocephalic and atraumatic.   Eyes:      Conjunctiva/sclera: Conjunctivae normal.      Pupils: Pupils are equal, round, and reactive to light.   Cardiovascular:      Rate and Rhythm: Normal rate and regular rhythm.      Heart sounds: Normal heart sounds.   Pulmonary:      Effort: Pulmonary effort is normal.      Breath sounds: Normal breath sounds.   Abdominal:      General: Bowel sounds are normal.      Palpations: Abdomen is soft.   Musculoskeletal:         General: Normal range of motion.      Cervical back: Normal range of motion.   Skin:     General: Skin is warm and dry.   Neurological:      Mental Status: He is alert and oriented to person, place, and time.      Deep Tendon Reflexes: Reflexes are normal and symmetric.   Psychiatric:         Behavior: Behavior normal.         Thought Content: Thought content normal.         Judgment: Judgment normal.         I have reviewed the following portions of the patient's history: Allergies, current medications, past family history, past medical history, past social history, past surgical history, problem list, and ROS and confirm it is accurate.    Recent Image (CT  and/or KUB):      CT Abdomen and Pelvis: Results for orders placed during the hospital encounter of 01/12/24    CT Abdomen Pelvis With & Without Contrast    Narrative  PROCEDURE: CT ABDOMEN PELVIS W WO CONTRAST-    HISTORY: Prostate cancer, high risk, staging; Y37-Phifmlgme neoplasm of  prostate    COMPARISON: 11/14/2023.    TECHNIQUE: Multiple axial CT images were obtained from the lung bases  through the pubic symphysis before and following the administration of  Isovue-300. . This study was performed with techniques to keep radiation  doses as low as reasonably achievable (ALARA). Individualized dose  reduction techniques using automated exposure control or adjustment of  mA and/or kV according to the patient size were employed.    FINDINGS:    ABDOMEN: The lung bases are clear are clear. The heart is normal in  size. The liver is normal. The spleen is unremarkable. No adrenal mass  is present. There is prominence of the pancreatic duct in the head and  neck of the pancreas unchanged compared to the prior exam. No focal  pancreatic lesions are identified. Precontrast images reveal no evidence  of nephrolithiasis. The kidneys enhance appropriately. There is no  evidence of a cystic or solid renal mass. The aorta is normal in  caliber. The mesenteric vasculature is patent. There is no free fluid or  adenopathy. The small bowel is unremarkable with no evidence of  obstruction.    PELVIS: The appendix is normal. There are colonic diverticula without  evidence of diverticulitis. The prostate gland is enlarged. The urinary  bladder is unremarkable. There is no significant free fluid or  adenopathy. Bone windows reveal no lytic or destructive lesions. There  are degenerative changes within the lumbar spine with grade 1  anterolisthesis of L4 on L5.    .    Impression  No evidence of metastatic disease within the abdomen or  pelvis.      This report was finalized on 1/12/2024 1:49 PM by Jewels Kennedy M.D..       CT  Stone Protocol: No results found for this or any previous visit.       KUB: No results found for this or any previous visit.       Labs (past 3 months):           Procedure:       Assessment/Plan:   Prostate cancer-negative staging workup.  Recommend radiation.  I will arrange.  Agent orange exposure - this is a dioxin-based pesticide that was used extensively in [place] in 1968 through the early 70s.  Exposure to this has been well-documented to significantly increase certain malignancies, particularly prostate cancer, bladder cancer, and renal cancer.  Any exposure of this with the appropriate history should be followed with PSAs.  Oncotype DX genomic prostate score is an RNA expression assay of 12 genes involved in androgen signaling, cellular organization, stromal response and cellular proliferation.  This test is performed on biopsy samples and the GPS is scored 0 to 100 with higher numbers indicating less favorable disease.  This is an excellent prospective validation of adverse pathology at prostatectomy or biopsy.          This document has been electronically signed by ALTHEA RESENDEZ MD February 12, 2024 15:08 EST    Dictated Utilizing Dragon Dictation: Part of this note may be an electronic transcription/translation of spoken language to printed text using the Dragon Dictation System.

## 2024-04-01 ENCOUNTER — OFFICE VISIT (OUTPATIENT)
Dept: UROLOGY | Facility: CLINIC | Age: 74
End: 2024-04-01
Payer: OTHER GOVERNMENT

## 2024-04-01 VITALS
BODY MASS INDEX: 23.51 KG/M2 | HEART RATE: 106 BPM | HEIGHT: 72 IN | WEIGHT: 173.6 LBS | SYSTOLIC BLOOD PRESSURE: 170 MMHG | DIASTOLIC BLOOD PRESSURE: 102 MMHG

## 2024-04-01 DIAGNOSIS — S90.211A CONTUSION OF RIGHT GREAT TOE WITH DAMAGE TO NAIL, INITIAL ENCOUNTER: ICD-10-CM

## 2024-04-01 DIAGNOSIS — C61 PROSTATE CANCER: ICD-10-CM

## 2024-04-01 DIAGNOSIS — S90.211A SUBUNGUAL HEMATOMA OF GREAT TOE OF RIGHT FOOT, INITIAL ENCOUNTER: ICD-10-CM

## 2024-04-01 DIAGNOSIS — R35.0 FREQUENCY OF MICTURITION: Primary | ICD-10-CM

## 2024-04-01 DIAGNOSIS — R30.0 DYSURIA: ICD-10-CM

## 2024-04-01 DIAGNOSIS — R33.9 INCOMPLETE EMPTYING OF BLADDER: ICD-10-CM

## 2024-04-01 LAB
BILIRUB BLD-MCNC: NEGATIVE MG/DL
CLARITY, POC: CLEAR
COLOR UR: YELLOW
EXPIRATION DATE: ABNORMAL
GLUCOSE UR STRIP-MCNC: NEGATIVE MG/DL
KETONES UR QL: NEGATIVE
LEUKOCYTE EST, POC: NEGATIVE
Lab: ABNORMAL
NITRITE UR-MCNC: NEGATIVE MG/ML
PH UR: 5 [PH] (ref 5–8)
PROT UR STRIP-MCNC: ABNORMAL MG/DL
RBC # UR STRIP: ABNORMAL /UL
SP GR UR: 1.03 (ref 1–1.03)
UROBILINOGEN UR QL: NORMAL

## 2024-04-01 RX ORDER — HYDROCODONE BITARTRATE AND ACETAMINOPHEN 5; 325 MG/1; MG/1
1 TABLET ORAL EVERY 8 HOURS PRN
Qty: 20 TABLET | Refills: 0 | Status: SHIPPED | OUTPATIENT
Start: 2024-04-01

## 2024-04-01 RX ORDER — PHENAZOPYRIDINE HYDROCHLORIDE 200 MG/1
200 TABLET, FILM COATED ORAL 3 TIMES DAILY PRN
Qty: 20 TABLET | Refills: 0 | Status: SHIPPED | OUTPATIENT
Start: 2024-04-01

## 2024-04-01 RX ORDER — DIPHENOXYLATE HYDROCHLORIDE AND ATROPINE SULFATE 2.5; .025 MG/1; MG/1
1 TABLET ORAL 4 TIMES DAILY PRN
Qty: 6 TABLET | Refills: 0 | Status: SHIPPED | OUTPATIENT
Start: 2024-04-01

## 2024-04-01 NOTE — PROGRESS NOTES
Chief Complaint:      Chief Complaint   Patient presents with    Prostate Cancer    Urinary Incontinence       HPI:   73 y.o. male returns today with extreme frequency and incontinence his urine is negative he is dysuria severe diarrhea put him on Gemtesa he had his Lupron 2 months ago I will see him back in a week he has an Oncotype we discussed that he is due to start his oncology very soon.  I will see him back in 1 week.    Past Medical History:     Past Medical History:   Diagnosis Date    Benign prostatic hyperplasia     Hyperlipidemia     Hypertension     Prostate cancer     Prostatic enlargement     Urinary incontinence     Urinary tract infection        Current Meds:     Current Outpatient Medications   Medication Sig Dispense Refill    ciprofloxacin (Cipro) 500 MG tablet Take one tablet twice a day before procedure 4 tablet 0    diazePAM (VALIUM) 10 MG tablet One tablet night before procedure at bedtime and one morning of procedure 2 tablet 0    diphenoxylate-atropine (LOMOTIL) 2.5-0.025 MG per tablet Take 1 tablet by mouth 4 (Four) Times a Day As Needed for Diarrhea. 6 tablet 0    finasteride (PROSCAR) 5 MG tablet Take 1 tablet by mouth Daily. 30 tablet 5    gabapentin (NEURONTIN) 300 MG capsule Take 1 capsule by mouth 2 (Two) Times a Day.      HYDROcodone-acetaminophen (NORCO) 5-325 MG per tablet Take 1 tablet by mouth Every 8 (Eight) Hours As Needed for Severe Pain. 9 tablet 0    Mirabegron ER (MYRBETRIQ) 25 MG tablet sustained-release 24 hour 24 hr tablet Take 1 tablet by mouth Daily. 30 tablet 0    phenazopyridine (Pyridium) 200 MG tablet Take 1 tablet by mouth 3 (Three) Times a Day As Needed for Bladder Spasms. 20 tablet 0    pravastatin (PRAVACHOL) 40 MG tablet Take 1 tablet by mouth Daily.      tamsulosin (FLOMAX) 0.4 MG capsule 24 hr capsule Take 1 capsule by mouth Every Night.      traZODone (DESYREL) 50 MG tablet Take 1 tablet by mouth Every Night.       No current facility-administered  medications for this visit.        Allergies:      No Known Allergies     Past Surgical History:     Past Surgical History:   Procedure Laterality Date    BACK SURGERY      HERNIA REPAIR  2014       Social History:     Social History     Socioeconomic History    Marital status:    Tobacco Use    Smoking status: Never    Smokeless tobacco: Never   Vaping Use    Vaping status: Never Used   Substance and Sexual Activity    Alcohol use: Yes     Comment: occasional    Drug use: No    Sexual activity: Never       Family History:     Family History   Problem Relation Age of Onset    Heart disease Father        Review of Systems:     Review of Systems   Constitutional: Negative.    HENT: Negative.     Eyes: Negative.    Respiratory: Negative.     Cardiovascular: Negative.    Gastrointestinal: Negative.    Endocrine: Negative.    Musculoskeletal: Negative.    Allergic/Immunologic: Negative.    Neurological: Negative.    Hematological: Negative.    Psychiatric/Behavioral: Negative.         Physical Exam:     Physical Exam  Vitals and nursing note reviewed.   Constitutional:       Appearance: He is well-developed.   HENT:      Head: Normocephalic and atraumatic.   Eyes:      Conjunctiva/sclera: Conjunctivae normal.      Pupils: Pupils are equal, round, and reactive to light.   Cardiovascular:      Rate and Rhythm: Normal rate and regular rhythm.      Heart sounds: Normal heart sounds.   Pulmonary:      Effort: Pulmonary effort is normal.      Breath sounds: Normal breath sounds.   Abdominal:      General: Bowel sounds are normal.      Palpations: Abdomen is soft.   Musculoskeletal:         General: Normal range of motion.      Cervical back: Normal range of motion.   Skin:     General: Skin is warm and dry.   Neurological:      Mental Status: He is alert and oriented to person, place, and time.      Deep Tendon Reflexes: Reflexes are normal and symmetric.   Psychiatric:         Behavior: Behavior normal.         Thought  Content: Thought content normal.         Judgment: Judgment normal.         I have reviewed the following portions of the patient's history: Allergies, current medications, past family history, past medical history, past social history, past surgical history, problem list, and ROS and confirm it is accurate.    Recent Image (CT and/or KUB):      CT Abdomen and Pelvis: Results for orders placed during the hospital encounter of 01/12/24    CT Abdomen Pelvis With & Without Contrast    Narrative  PROCEDURE: CT ABDOMEN PELVIS W WO CONTRAST-    HISTORY: Prostate cancer, high risk, staging; K13-Mckvjoeen neoplasm of  prostate    COMPARISON: 11/14/2023.    TECHNIQUE: Multiple axial CT images were obtained from the lung bases  through the pubic symphysis before and following the administration of  Isovue-300. . This study was performed with techniques to keep radiation  doses as low as reasonably achievable (ALARA). Individualized dose  reduction techniques using automated exposure control or adjustment of  mA and/or kV according to the patient size were employed.    FINDINGS:    ABDOMEN: The lung bases are clear are clear. The heart is normal in  size. The liver is normal. The spleen is unremarkable. No adrenal mass  is present. There is prominence of the pancreatic duct in the head and  neck of the pancreas unchanged compared to the prior exam. No focal  pancreatic lesions are identified. Precontrast images reveal no evidence  of nephrolithiasis. The kidneys enhance appropriately. There is no  evidence of a cystic or solid renal mass. The aorta is normal in  caliber. The mesenteric vasculature is patent. There is no free fluid or  adenopathy. The small bowel is unremarkable with no evidence of  obstruction.    PELVIS: The appendix is normal. There are colonic diverticula without  evidence of diverticulitis. The prostate gland is enlarged. The urinary  bladder is unremarkable. There is no significant free fluid  or  adenopathy. Bone windows reveal no lytic or destructive lesions. There  are degenerative changes within the lumbar spine with grade 1  anterolisthesis of L4 on L5.    .    Impression  No evidence of metastatic disease within the abdomen or  pelvis.      This report was finalized on 1/12/2024 1:49 PM by Jewels Kennedy M.D..       CT Stone Protocol: No results found for this or any previous visit.       KUB: No results found for this or any previous visit.       Labs (past 3 months):      Office Visit on 01/30/2024   Component Date Value Ref Range Status    PSA 01/30/2024 18.400 (H)  0.000 - 4.000 ng/mL Final   Hospital Outpatient Visit on 01/12/2024   Component Date Value Ref Range Status    Creatinine 01/12/2024 0.90  0.60 - 1.30 mg/dL Final    Serial Number: [id number]:  [id number]        Procedure:       Assessment/Plan:   Prostate cancer:  He returns today status post biopsy for extensive discussion of prostate cancer.  We discussed staging and grading of the disease.  I described with a Chelo system being from a 2 to10 scale with the most common low-grade pattern being a Chelo 6.  I discussed the staging workup including a total body bone scan and CT scan especially with a PSA greater than 10.  We discussed the various options at length. I discussed a radical retropubic prostatectomy done in the traditional fashion and using the robotic technique.  I then discussed radiation treatment both seed therapy and external beam therapy using Gold fiduciary markers.  We talked about some of the other alternatives such as a cryosurgical ablation at high intensity focused ultrasound as being viable alternatives but not recommended at this time.  He focused on aggressive watchful waiting and explained that currently low literature it's been discovered that a lot of men can actually observe it especially with numerous other comorbidities cannot have problems from the cancer by itself.  Talked about hormonal  ablation and the side effects of creation of insulin resistance.  Overall, the patient was given appropriate literature, is going to think about it, and I'm going to revisit the topic with them after the next office visit.  Urinary frequency of an extreme nature with diarrhea given Gemtesa pain medication and empiric antibiotics.  Oncotype DX genomic prostate score is an RNA expression assay of 12 genes involved in androgen signaling, cellular organization, stromal response and cellular proliferation.  This test is performed on biopsy samples and the GPS is scored 0 to 100 with higher numbers indicating less favorable disease.  This is an excellent prospective validation of adverse pathology at prostatectomy or biopsy.              This document has been electronically signed by ALTHEA RESENDEZ MD April 1, 2024 15:12 EDT    Dictated Utilizing Dragon Dictation: Part of this note may be an electronic transcription/translation of spoken language to printed text using the Dragon Dictation System.

## 2024-04-08 ENCOUNTER — OFFICE VISIT (OUTPATIENT)
Dept: UROLOGY | Facility: CLINIC | Age: 74
End: 2024-04-08
Payer: OTHER GOVERNMENT

## 2024-04-08 VITALS
BODY MASS INDEX: 23.43 KG/M2 | WEIGHT: 173 LBS | HEART RATE: 112 BPM | SYSTOLIC BLOOD PRESSURE: 165 MMHG | HEIGHT: 72 IN | DIASTOLIC BLOOD PRESSURE: 91 MMHG

## 2024-04-08 DIAGNOSIS — C61 PROSTATE CANCER: Primary | ICD-10-CM

## 2024-04-08 PROCEDURE — 36415 COLL VENOUS BLD VENIPUNCTURE: CPT | Performed by: UROLOGY

## 2024-04-08 PROCEDURE — 99213 OFFICE O/P EST LOW 20 MIN: CPT | Performed by: UROLOGY

## 2024-04-08 PROCEDURE — 84153 ASSAY OF PSA TOTAL: CPT | Performed by: UROLOGY

## 2024-04-08 NOTE — PROGRESS NOTES
Chief Complaint:      Chief Complaint   Patient presents with    Prostate Cancer       HPI:   73 y.o. male returns today.  His diarrhea is better he got up twice last night doing well working to set him up for radiation sometime in the summer PSA is pending    Past Medical History:     Past Medical History:   Diagnosis Date    Benign prostatic hyperplasia     Hyperlipidemia     Hypertension     Prostate cancer     Prostatic enlargement     Urinary incontinence     Urinary tract infection        Current Meds:     Current Outpatient Medications   Medication Sig Dispense Refill    ciprofloxacin (Cipro) 500 MG tablet Take one tablet twice a day before procedure 4 tablet 0    diazePAM (VALIUM) 10 MG tablet One tablet night before procedure at bedtime and one morning of procedure 2 tablet 0    diphenoxylate-atropine (LOMOTIL) 2.5-0.025 MG per tablet Take 1 tablet by mouth 4 (Four) Times a Day As Needed for Diarrhea. 6 tablet 0    finasteride (PROSCAR) 5 MG tablet Take 1 tablet by mouth Daily. 30 tablet 5    gabapentin (NEURONTIN) 300 MG capsule Take 1 capsule by mouth 2 (Two) Times a Day.      HYDROcodone-acetaminophen (NORCO) 5-325 MG per tablet Take 1 tablet by mouth Every 8 (Eight) Hours As Needed for Severe Pain. 20 tablet 0    Mirabegron ER (MYRBETRIQ) 25 MG tablet sustained-release 24 hour 24 hr tablet Take 1 tablet by mouth Daily. 30 tablet 0    phenazopyridine (Pyridium) 200 MG tablet Take 1 tablet by mouth 3 (Three) Times a Day As Needed for Bladder Spasms. 20 tablet 0    pravastatin (PRAVACHOL) 40 MG tablet Take 1 tablet by mouth Daily.      tamsulosin (FLOMAX) 0.4 MG capsule 24 hr capsule Take 1 capsule by mouth Every Night.      traZODone (DESYREL) 50 MG tablet Take 1 tablet by mouth Every Night.       No current facility-administered medications for this visit.        Allergies:      No Known Allergies     Past Surgical History:     Past Surgical History:   Procedure Laterality Date    BACK SURGERY       HERNIA REPAIR  2014       Social History:     Social History     Socioeconomic History    Marital status:    Tobacco Use    Smoking status: Never    Smokeless tobacco: Never   Vaping Use    Vaping status: Never Used   Substance and Sexual Activity    Alcohol use: Yes     Comment: occasional    Drug use: No    Sexual activity: Never       Family History:     Family History   Problem Relation Age of Onset    Heart disease Father        Review of Systems:     Review of Systems   Constitutional: Negative.    HENT: Negative.     Eyes: Negative.    Respiratory: Negative.     Cardiovascular: Negative.    Gastrointestinal: Negative.    Endocrine: Negative.    Musculoskeletal: Negative.    Allergic/Immunologic: Negative.    Neurological: Negative.    Hematological: Negative.    Psychiatric/Behavioral: Negative.         Physical Exam:     Physical Exam  Vitals and nursing note reviewed.   Constitutional:       Appearance: He is well-developed.   HENT:      Head: Normocephalic and atraumatic.   Eyes:      Conjunctiva/sclera: Conjunctivae normal.      Pupils: Pupils are equal, round, and reactive to light.   Cardiovascular:      Rate and Rhythm: Normal rate and regular rhythm.      Heart sounds: Normal heart sounds.   Pulmonary:      Effort: Pulmonary effort is normal.      Breath sounds: Normal breath sounds.   Abdominal:      General: Bowel sounds are normal.      Palpations: Abdomen is soft.   Musculoskeletal:         General: Normal range of motion.      Cervical back: Normal range of motion.   Skin:     General: Skin is warm and dry.   Neurological:      Mental Status: He is alert and oriented to person, place, and time.      Deep Tendon Reflexes: Reflexes are normal and symmetric.   Psychiatric:         Behavior: Behavior normal.         Thought Content: Thought content normal.         Judgment: Judgment normal.         I have reviewed the following portions of the patient's history: Allergies, current medications,  past family history, past medical history, past social history, past surgical history, problem list, and ROS and confirm it is accurate.    Recent Image (CT and/or KUB):      CT Abdomen and Pelvis: Results for orders placed during the hospital encounter of 01/12/24    CT Abdomen Pelvis With & Without Contrast    Narrative  PROCEDURE: CT ABDOMEN PELVIS W WO CONTRAST-    HISTORY: Prostate cancer, high risk, staging; [id number] neoplasm of  prostate    COMPARISON: 11/14/2023.    TECHNIQUE: Multiple axial CT images were obtained from the lung bases  through the pubic symphysis before and following the administration of  Isovue-300. . This study was performed with techniques to keep radiation  doses as low as reasonably achievable (ALARA). Individualized dose  reduction techniques using automated exposure control or adjustment of  mA and/or kV according to the patient size were employed.    FINDINGS:    ABDOMEN: The lung bases are clear are clear. The heart is normal in  size. The liver is normal. The spleen is unremarkable. No adrenal mass  is present. There is prominence of the pancreatic duct in the head and  neck of the pancreas unchanged compared to the prior exam. No focal  pancreatic lesions are identified. Precontrast images reveal no evidence  of nephrolithiasis. The kidneys enhance appropriately. There is no  evidence of a cystic or solid renal mass. The aorta is normal in  caliber. The mesenteric vasculature is patent. There is no free fluid or  adenopathy. The small bowel is unremarkable with no evidence of  obstruction.    PELVIS: The appendix is normal. There are colonic diverticula without  evidence of diverticulitis. The prostate gland is enlarged. The urinary  bladder is unremarkable. There is no significant free fluid or  adenopathy. Bone windows reveal no lytic or destructive lesions. There  are degenerative changes within the lumbar spine with grade 1  anterolisthesis of L4 on  L5.    .    Impression  No evidence of metastatic disease within the abdomen or  pelvis.      This report was finalized on 1/12/2024 1:49 PM by Jewels Kennedy M.D..       CT Stone Protocol: No results found for this or any previous visit.       KUB: No results found for this or any previous visit.       Labs (past 3 months):      Office Visit on 04/01/2024   Component Date Value Ref Range Status    Color 04/01/2024 Yellow  Yellow, Straw, Dark Yellow, Sandra Final    Clarity, UA 04/01/2024 Clear  Clear Final    Specific Gravity  04/01/2024 1.030  1.005 - 1.030 Final    pH, Urine 04/01/2024 5.0  5.0 - 8.0 Final    Leukocytes 04/01/2024 Negative  Negative Final    Nitrite, UA 04/01/2024 Negative  Negative Final    Protein, POC 04/01/2024 Trace (A)  Negative mg/dL Final    Glucose, UA 04/01/2024 Negative  Negative mg/dL Final    Ketones, UA 04/01/2024 Negative  Negative Final    Urobilinogen, UA 04/01/2024 Normal  Normal, 0.2 E.U./dL Final    Bilirubin 04/01/2024 Negative  Negative Final    Blood, UA 04/01/2024 Trace (A)  Negative Final    Lot Number 04/01/2024 [id number]   Final    Expiration Date 04/01/2024 10/25/24   Final   Office Visit on 01/30/2024   Component Date Value Ref Range Status    PSA 01/30/2024 18.400 (H)  0.000 - 4.000 ng/mL Final   Hospital Outpatient Visit on 01/12/2024   Component Date Value Ref Range Status    Creatinine 01/12/2024 0.90  0.60 - 1.30 mg/dL Final    Serial Number: [id number]:  [id number]        Procedure:       Assessment/Plan:   Prostate cancer:  He returns today status post biopsy for extensive discussion of prostate cancer.  We discussed staging and grading of the disease.  I described with a Chelo system being from a 2 to10 scale with the most common low-grade pattern being a [place] 6.  I discussed the staging workup including a total body bone scan and CT scan especially with a PSA greater than 10.  We discussed the various options at length. I discussed a radical  retropubic prostatectomy done in the traditional fashion and using the robotic technique.  I then discussed radiation treatment both seed therapy and external beam therapy using Gold fiduciary markers.  We talked about some of the other alternatives such as a cryosurgical ablation at high intensity focused ultrasound as being viable alternatives but not recommended at this time.  He focused on aggressive watchful waiting and explained that currently low literature it's been discovered that a lot of men can actually observe it especially with numerous other comorbidities cannot have problems from the cancer by itself.  Talked about hormonal ablation and the side effects of creation of insulin resistance.  Overall, the patient was given appropriate literature, is going to think about it, and I'm going to revisit the topic with them after the next office visit.  Will get a set him up for radiation treatment sometime in the summer    Patient reports that he is not currently experiencing any symptoms of urinary incontinence.      BMI is within normal parameters. No other follow-up for BMI required.              This document has been electronically signed by ALTHEA RESENDEZ MD April 8, 2024 14:43 EDT    Dictated Utilizing Dragon Dictation: Part of this note may be an electronic transcription/translation of spoken language to printed text using the Dragon Dictation System.

## 2024-04-09 ENCOUNTER — TELEPHONE (OUTPATIENT)
Dept: UROLOGY | Facility: CLINIC | Age: 74
End: 2024-04-09
Payer: MEDICARE

## 2024-04-09 LAB — PSA SERPL-MCNC: 2.75 NG/ML (ref 0–4)

## 2024-04-09 NOTE — TELEPHONE ENCOUNTER
I called and left a vm that the pt's PSA was now in a normal range and everything looked great!    ----- Message from Shay Muir MD sent at 4/9/2024  9:40 AM EDT -----  Notify great  ----- Message -----  From: Lab, Background User  Sent: 4/9/2024   2:10 AM EDT  To: Shay Muir MD

## 2024-04-17 ENCOUNTER — OFFICE VISIT (OUTPATIENT)
Dept: RADIATION ONCOLOGY | Facility: HOSPITAL | Age: 74
End: 2024-04-17
Payer: MEDICARE

## 2024-04-17 ENCOUNTER — HOSPITAL ENCOUNTER (OUTPATIENT)
Dept: RADIATION ONCOLOGY | Facility: HOSPITAL | Age: 74
Setting detail: RADIATION/ONCOLOGY SERIES
End: 2024-04-17
Payer: MEDICARE

## 2024-04-17 VITALS
RESPIRATION RATE: 20 BRPM | DIASTOLIC BLOOD PRESSURE: 85 MMHG | SYSTOLIC BLOOD PRESSURE: 153 MMHG | HEART RATE: 81 BPM | BODY MASS INDEX: 23.45 KG/M2 | WEIGHT: 173.1 LBS | OXYGEN SATURATION: 91 % | TEMPERATURE: 97.2 F | HEIGHT: 72 IN

## 2024-04-17 DIAGNOSIS — C61 PROSTATE CANCER: Primary | ICD-10-CM

## 2024-04-17 PROCEDURE — G0463 HOSPITAL OUTPT CLINIC VISIT: HCPCS

## 2024-04-17 NOTE — PROGRESS NOTES
CONSULTATION NOTE      :                                                          1950  DATE OF CONSULTATION:                       2024   REQUESTING PHYSICIAN:                   Shay Muir, *  REASON FOR CONSULTATION:           Prostate cancer  - Stage IIC (cT1c, cN0, cM0, PSA: 18.4, Grade Group: 3)         BRIEF HISTORY:  The patient is a very pleasant 73 y.o. male  with diagnosis of prostate cancer.  He has a history of significant BPH and urinary outflow obstructive symptoms.  PSA was noted to be significantly increased with values of 14 ng/ml on 10/27/2023 and maximum value 18.4 ng/ml on 2024.  Prostate measured 76.2 cc volume.  Biopsy 2023 showed presence of prostatic adenocarcinoma bilaterally.  4 out of 4 cores from the right lobe contained neoplasm.  Chelo's 3+3 = 6 was present at the right apex and right base.  Chelo's 3+4=7 was present at the right mid involving 9 mm core sample length and right lateral mid involving 8 mm with focal perineural invasion.  3 out of 4 cores from the left lobe contained neoplasm.  Chelo's 4+3=7 was present in the left mid gland involving 7 mm length with focal perineural invasion at the left base involving 1 mm.  Sargents's 3+4=7 was present left lateral base involving 10 mm sample length with focal perineural invasion.  Genomic GPS score was 57 indicating unfavorable intermediate risk disease.  He initiated androgen ablation with LHRH agonist 6-month dose 2024.  He is tolerating that with some hot flashes.  PSA is already decreased to 2.25 ng/ml on 2024.  CT abdomen pelvis showed no evidence of metastatic disease.  Nuclear medicine bone scan showed no evidence of bone metastasis.  Patient is otherwise reasonably healthy and should have repeat longevity greater than 10 years.  He is interested in definitive treatment.    Allergy: No Known Allergies    Social History:   Social History     Socioeconomic History    Marital  status:    Tobacco Use    Smoking status: Never    Smokeless tobacco: Never   Vaping Use    Vaping status: Never Used   Substance and Sexual Activity    Alcohol use: Yes     Comment: occasional    Drug use: No    Sexual activity: Never       Past Medical History:   Past Medical History:   Diagnosis Date    Benign prostatic hyperplasia     Hyperlipidemia     Hypertension     Prostate cancer     Prostatic enlargement     Skin cancer     Urinary incontinence     Urinary tract infection        Family History: family history includes Heart disease in his father; Liver disease in his mother.     Surgical History:   Past Surgical History:   Procedure Laterality Date    BACK SURGERY      HERNIA REPAIR  2014    PROSTATE BIOPSY  12/21/2023        Review of Systems:   Review of Systems   Constitutional:  Positive for fatigue.   Respiratory:  Positive for cough.    Endocrine: Positive for hot flashes.   Genitourinary:  Positive for dysuria and nocturia.    Musculoskeletal:  Positive for arthralgias, back pain and neck pain.   Neurological:  Positive for dizziness, headaches and light-headedness.   Psychiatric/Behavioral:  Positive for sleep disturbance.            IPSS Questionnaire (AUA-7):  Over the past month…    1)  Incomplete Emptying  How often have you had a sensation of not emptying your bladder?  1 - Less than 1 time in 5   2)  Frequency  How often have you had to urinate less than every two hours? 3 - About half the time   3)  Intermittency  How often have you found you stopped and started again several times when you urinated?  1 - Less than 1 time in 5   4) Urgency  How often have you found it difficult to postpone urination?  3 - About half the time   5) Weak Stream  How often have you had a weak urinary stream?  1 - Less than 1 time in 5   6) Straining  How often have you had to push or strain to begin urination?  4 - More than half the time   7) Nocturia  How many times did you typically get up at night to  "urinate?  2 - 2 times   Total Score:  15       Quality of life due to urinary symptoms:  If you were to spend the rest of your life with your urinary condition the way it is now, how would you feel about that? 5-Unhappy   Urine Leakage (Incontinence) 0-No Leakage     Sexual Health Inventory  Current Status    1)  How do you rate your confidence that you could achieve and keep an erection? 3-Moderate   2) When you had erections with sexual stimulation, how often were your erections hard enough for penetration (entering your partner)? 3-Sometime (about half the time)   3)  During sexual intercourse, how often were you able to maintain your erection after you had penetrated (entered) into your partner? 3-Sometimes (about half the time)   4) During sexual intercourse, how difficult was it to maintain your erection to completion of intercourse? 3-Difficult   5) When you attempted sexual intercourse, how often was it satisfactory to you? 5 -almost always or always   Total Score: 17       Bowel Health Inventory  Current Status: 0-No problems, no rectal bleeding, no discharge, less then 5 bowel movements a day                Objective   VITAL SIGNS:   Vitals:    04/17/24 1300   BP: 153/85   Pulse: 81   Resp: 20   Temp: 97.2 °F (36.2 °C)   TempSrc: Skin   SpO2: 91%   Weight: 78.5 kg (173 lb 1.6 oz)   Height: 182.9 cm (72\")   PainSc:   4   PainLoc: Generalized        Karnofsky score: 80       Physical Exam:   Physical Exam  Vitals and nursing note reviewed.   Constitutional:       Appearance: He is well-developed.   HENT:      Head: Normocephalic and atraumatic.   Cardiovascular:      Rate and Rhythm: Normal rate and regular rhythm.      Heart sounds: Normal heart sounds. No murmur heard.  Pulmonary:      Effort: Pulmonary effort is normal.      Breath sounds: Normal breath sounds. No wheezing or rales.   Abdominal:      General: Bowel sounds are normal. There is no distension.      Palpations: Abdomen is soft.      " Tenderness: There is no abdominal tenderness.   Genitourinary:     Prostate: Enlarged (Symmetrical enlarged, elongated with smooth surface no nodularity or induration, estimate 60 to 80 cc volume.  Seminal vesicles are nonpalpable.). Not tender and no nodules present.      Rectum: No mass, tenderness, anal fissure, external hemorrhoid or internal hemorrhoid. Normal anal tone.   Musculoskeletal:         General: No tenderness (Chronic low back pain). Normal range of motion.      Cervical back: Normal range of motion and neck supple.   Lymphadenopathy:      Cervical: No cervical adenopathy.      Upper Body:      Right upper body: No supraclavicular adenopathy.      Left upper body: No supraclavicular adenopathy.   Skin:     General: Skin is warm and dry.   Neurological:      Mental Status: He is alert and oriented to person, place, and time.      Sensory: No sensory deficit.   Psychiatric:         Behavior: Behavior normal.         Thought Content: Thought content normal.         Judgment: Judgment normal.              The following portions of the patient's history were reviewed and updated as appropriate: allergies, current medications, past family history, past medical history, past social history, past surgical history, and problem list.    Assessment:   Assessment      Prostate cancer, Chelo's 4+3=7, clinical stage IIC (T1c, M0, M0), PSA 18.4 ng/ml.  He has unfavorable intermediate risk disease.  He is already initiated treatment with androgen ablation and has had significant decrease in PSA to value 2.25 ng/ml indicating he has at least partially hormone sensitive disease.  We reviewed the radiotherapy treatment options and he is most interested in stereotactic body radiotherapy rather than conventionally fractionated radiotherapy.  CyberKnife treatment procedures been reviewed in detail today.  Informed consent was obtained.    RECOMMENDATIONS: He will return to Dr. Muir for placement of gold seed  fiducials.  He will subsequently return here for treatment planning CT and MRI pelvis.  The prostate gland and proximal seminal vesicles will receive 5 fractions of 7 Gray each, delivered on CyberKnife.    I spent a total of 55 minutes on todays visit, with more than 45 minutes in direct face to face communication, and the remainder of the time spent in reviewing the relevant history, records, available imaging, and for documentation.    Follow Up:   Return in about 4 weeks (around 5/15/2024) for Office Visit, Simulation.  Diagnoses and all orders for this visit:    1. Prostate cancer (Primary)  -     Ambulatory Referral to ONC Social Work         Elkin Kelsey MD

## 2024-04-18 ENCOUNTER — TELEPHONE (OUTPATIENT)
Dept: UROLOGY | Facility: CLINIC | Age: 74
End: 2024-04-18
Payer: MEDICARE

## 2024-04-18 DIAGNOSIS — C61 PROSTATE CANCER: Primary | ICD-10-CM

## 2024-04-18 RX ORDER — CIPROFLOXACIN 500 MG/1
TABLET, FILM COATED ORAL
Qty: 4 TABLET | Refills: 0 | Status: SHIPPED | OUTPATIENT
Start: 2024-04-18

## 2024-04-18 RX ORDER — SODIUM PHOSPHATE,MONO-DIBASIC 19G-7G/118
ENEMA (ML) RECTAL
Qty: 1 ENEMA | Refills: 0 | Status: SHIPPED | OUTPATIENT
Start: 2024-04-18

## 2024-04-18 RX ORDER — CLINDAMYCIN HYDROCHLORIDE 300 MG/1
300 CAPSULE ORAL 2 TIMES DAILY
Qty: 6 CAPSULE | Refills: 0 | Status: SHIPPED | OUTPATIENT
Start: 2024-04-18 | End: 2024-04-21

## 2024-04-18 RX ORDER — DIAZEPAM 10 MG/1
TABLET ORAL
Qty: 2 TABLET | Refills: 0 | Status: SHIPPED | OUTPATIENT
Start: 2024-04-18

## 2024-04-18 NOTE — TELEPHONE ENCOUNTER
Patient has been scheduled for fiducial marker placement. Ask that enema, antibiotics, and valium be sent to [facility] on [address] in [place].

## 2024-04-22 ENCOUNTER — DOCUMENTATION (OUTPATIENT)
Dept: OTHER | Facility: HOSPITAL | Age: 74
End: 2024-04-22
Payer: MEDICARE

## 2024-04-22 NOTE — PROGRESS NOTES
Distress Screening Follow-up    Name: Tip Palacios    : 1950    Diagnosis: Prostate Cancer    Location of Distress Screening: Radiation Oncology    Distress Level: 8 (2024  1:00 PM)     Interventions: n/a    Comments:  SW called to follow up with pt regarding recent distress screen results. SW left a VM with pt introducing self, and offering additional support. SW provided call back number, ([phone]) and encouraged pt to reach out at his convenience.     SW will remain available to offer support.     Ramonita Villalta, MSW  Oncology Social Worker

## 2024-04-23 ENCOUNTER — TELEPHONE (OUTPATIENT)
Dept: RADIATION ONCOLOGY | Facility: HOSPITAL | Age: 74
End: 2024-04-23
Payer: MEDICARE

## 2024-04-23 NOTE — TELEPHONE ENCOUNTER
Attempted to contact pt regarding ck planning.  No answer. Left message explaining that I had sent a my chart message and that he has not read it.  I explained that he could read that message or call me back and that I would also send instructions in the mail.

## 2024-04-24 NOTE — PROGRESS NOTES
SW received return call from pt's family member requesting transportation and lodging for upcoming radiation. Pt does have car in name, and has medicare, therefore SW explained it would be a private pay trip through [employer]. SW asked if he had any friends or family that could assist to which she was unsure. SW provided education on [facility] and offered to send referral when treatment plan was established. Pts family member confirmed and thanked ABHAY. SW encouraged her to reach out ongoing and will be available should other needs arise.

## 2024-05-10 ENCOUNTER — TELEPHONE (OUTPATIENT)
Dept: RADIATION ONCOLOGY | Facility: HOSPITAL | Age: 74
End: 2024-05-10
Payer: MEDICARE

## 2024-05-14 ENCOUNTER — PROCEDURE VISIT (OUTPATIENT)
Dept: UROLOGY | Facility: CLINIC | Age: 74
End: 2024-05-14
Payer: MEDICARE

## 2024-05-14 VITALS
WEIGHT: 171.2 LBS | DIASTOLIC BLOOD PRESSURE: 68 MMHG | SYSTOLIC BLOOD PRESSURE: 137 MMHG | BODY MASS INDEX: 23.19 KG/M2 | HEART RATE: 72 BPM | HEIGHT: 72 IN

## 2024-05-14 DIAGNOSIS — C61 PROSTATE CANCER: Primary | ICD-10-CM

## 2024-05-14 RX ORDER — GENTAMICIN 40 MG/ML
80 INJECTION, SOLUTION INTRAMUSCULAR; INTRAVENOUS ONCE
Status: COMPLETED | OUTPATIENT
Start: 2024-05-14 | End: 2024-05-14

## 2024-05-14 RX ORDER — HYDROCODONE BITARTRATE AND ACETAMINOPHEN 10; 325 MG/1; MG/1
1 TABLET ORAL EVERY 6 HOURS PRN
Qty: 20 TABLET | Refills: 0 | Status: SHIPPED | OUTPATIENT
Start: 2024-05-14

## 2024-05-14 RX ADMIN — GENTAMICIN 80 MG: 40 INJECTION, SOLUTION INTRAMUSCULAR; INTRAVENOUS at 09:30

## 2024-05-14 NOTE — PROGRESS NOTES
Chief Complaint:      Chief Complaint   Patient presents with    Biopsy     Fiducal marker placement       HPI:   73 y.o. male here for fiduciary markers    Past Medical History:     Past Medical History:   Diagnosis Date    Benign prostatic hyperplasia     Hyperlipidemia     Hypertension     Prostate cancer     Prostatic enlargement     Skin cancer     Urinary incontinence     Urinary tract infection        Current Meds:     Current Outpatient Medications   Medication Sig Dispense Refill    ciprofloxacin (Cipro) 500 MG tablet Take one tablet twice a day before procedure 4 tablet 0    diazePAM (VALIUM) 10 MG tablet One tablet night before procedure at bedtime and one morning of procedure 2 tablet 0    diphenoxylate-atropine (LOMOTIL) 2.5-0.025 MG per tablet Take 1 tablet by mouth 4 (Four) Times a Day As Needed for Diarrhea. 6 tablet 0    finasteride (PROSCAR) 5 MG tablet Take 1 tablet by mouth Daily. 30 tablet 5    gabapentin (NEURONTIN) 300 MG capsule Take 1 capsule by mouth 2 (Two) Times a Day.      Mirabegron ER (MYRBETRIQ) 25 MG tablet sustained-release 24 hour 24 hr tablet Take 1 tablet by mouth Daily. 30 tablet 0    phenazopyridine (Pyridium) 200 MG tablet Take 1 tablet by mouth 3 (Three) Times a Day As Needed for Bladder Spasms. 20 tablet 0    pravastatin (PRAVACHOL) 40 MG tablet Take 1 tablet by mouth Daily.      Sodium Phosphates ([employer] Enema Disposable) 19-7 GM/118ML enema Use morning of the procedure 1 enema 0    tamsulosin (FLOMAX) 0.4 MG capsule 24 hr capsule Take 1 capsule by mouth Every Night.      traZODone (DESYREL) 50 MG tablet Take 1 tablet by mouth Every Night.       No current facility-administered medications for this visit.        Allergies:      No Known Allergies     Past Surgical History:     Past Surgical History:   Procedure Laterality Date    BACK SURGERY      HERNIA REPAIR  2014    PROSTATE BIOPSY  12/21/2023       Social History:     Social History     Socioeconomic History    Marital  status:    Tobacco Use    Smoking status: Never    Smokeless tobacco: Never   Vaping Use    Vaping status: Never Used   Substance and Sexual Activity    Alcohol use: Yes     Comment: occasional    Drug use: No    Sexual activity: Never       Family History:     Family History   Problem Relation Age of Onset    Liver disease Mother     Heart disease Father        Review of Systems:     Review of Systems   Constitutional: Negative.    HENT: Negative.     Eyes: Negative.    Respiratory: Negative.     Cardiovascular: Negative.    Gastrointestinal: Negative.    Endocrine: Negative.    Musculoskeletal: Negative.    Allergic/Immunologic: Negative.    Neurological: Negative.    Hematological: Negative.    Psychiatric/Behavioral: Negative.         Physical Exam:     Physical Exam  Vitals and nursing note reviewed.   Constitutional:       Appearance: He is well-developed.   HENT:      Head: Normocephalic and atraumatic.   Eyes:      Conjunctiva/sclera: Conjunctivae normal.      Pupils: Pupils are equal, round, and reactive to light.   Cardiovascular:      Rate and Rhythm: Normal rate and regular rhythm.      Heart sounds: Normal heart sounds.   Pulmonary:      Effort: Pulmonary effort is normal.      Breath sounds: Normal breath sounds.   Abdominal:      General: Bowel sounds are normal.      Palpations: Abdomen is soft.   Genitourinary:     Prostate: Normal.      Rectum: Normal.   Musculoskeletal:         General: Normal range of motion.      Cervical back: Normal range of motion.   Skin:     General: Skin is warm and dry.   Neurological:      Mental Status: He is alert and oriented to person, place, and time.      Deep Tendon Reflexes: Reflexes are normal and symmetric.   Psychiatric:         Behavior: Behavior normal.         Thought Content: Thought content normal.         Judgment: Judgment normal.         I have reviewed the following portions of the patient's history: Allergies, current medications, past family  history, past medical history, past social history, past surgical history, problem list, and ROS and confirm it is accurate.    Recent Image (CT and/or KUB):      CT Abdomen and Pelvis: Results for orders placed during the hospital encounter of 01/12/24    CT Abdomen Pelvis With & Without Contrast    Narrative  PROCEDURE: CT ABDOMEN PELVIS W WO CONTRAST-    HISTORY: Prostate cancer, high risk, staging; U34-Wuxviegic neoplasm of  prostate    COMPARISON: 11/14/2023.    TECHNIQUE: Multiple axial CT images were obtained from the lung bases  through the pubic symphysis before and following the administration of  Isovue-300. . This study was performed with techniques to keep radiation  doses as low as reasonably achievable (ALARA). Individualized dose  reduction techniques using automated exposure control or adjustment of  mA and/or kV according to the patient size were employed.    FINDINGS:    ABDOMEN: The lung bases are clear are clear. The heart is normal in  size. The liver is normal. The spleen is unremarkable. No adrenal mass  is present. There is prominence of the pancreatic duct in the head and  neck of the pancreas unchanged compared to the prior exam. No focal  pancreatic lesions are identified. Precontrast images reveal no evidence  of nephrolithiasis. The kidneys enhance appropriately. There is no  evidence of a cystic or solid renal mass. The aorta is normal in  caliber. The mesenteric vasculature is patent. There is no free fluid or  adenopathy. The small bowel is unremarkable with no evidence of  obstruction.    PELVIS: The appendix is normal. There are colonic diverticula without  evidence of diverticulitis. The prostate gland is enlarged. The urinary  bladder is unremarkable. There is no significant free fluid or  adenopathy. Bone windows reveal no lytic or destructive lesions. There  are degenerative changes within the lumbar spine with grade 1  anterolisthesis of L4 on L5.    .    Impression  No  evidence of metastatic disease within the abdomen or  pelvis.      This report was finalized on 1/12/2024 1:49 PM by Jewels Kennedy M.D..       CT Stone Protocol: No results found for this or any previous visit.       KUB: No results found for this or any previous visit.       Labs (past 3 months):      Office Visit on 04/08/2024   Component Date Value Ref Range Status    PSA 04/08/2024 2.750  0.000 - 4.000 ng/mL Final   Office Visit on 04/01/2024   Component Date Value Ref Range Status    Color 04/01/2024 Yellow  Yellow, Straw, Dark Yellow, Sandra Final    Clarity, UA 04/01/2024 Clear  Clear Final    Specific Gravity  04/01/2024 1.030  1.005 - 1.030 Final    pH, Urine 04/01/2024 5.0  5.0 - 8.0 Final    Leukocytes 04/01/2024 Negative  Negative Final    Nitrite, UA 04/01/2024 Negative  Negative Final    Protein, POC 04/01/2024 Trace (A)  Negative mg/dL Final    Glucose, UA 04/01/2024 Negative  Negative mg/dL Final    Ketones, UA 04/01/2024 Negative  Negative Final    Urobilinogen, UA 04/01/2024 Normal  Normal, 0.2 E.U./dL Final    Bilirubin 04/01/2024 Negative  Negative Final    Blood, UA 04/01/2024 Trace (A)  Negative Final    Lot Number 04/01/2024 [id number]   Final    Expiration Date 04/01/2024 10/25/24   Final        Procedure:     Prostate fiducial marker placement: After an appropriate informed consent including the risk of anesthesia, bleeding, infection he had preoperative antibiotics including ciprofloxacin, clindamycin and gentamicin intra operatively.  He has also had a cleansing enema I used the GE multiplanar probe to visualize the prostate no measurements were taken.  I then anesthetized the rectal mucosa with 10 cc of 2% viscous Xylocaine jelly as well as a prostate block with a 22-gauge needle and 10 cc of 1% Xylocaine injecting it in the rectal wall submucosally.  I then placed a total of 6 fiduciary gold seeds in the prostate across the base and in the midportion.  No complications were  encountered the procedure was tolerated well.    Assessment/Plan:   Prostate cancer:  He returns today status post biopsy for extensive discussion of prostate cancer.  We discussed staging and grading of the disease.  I described with a Chelo system being from a 2 to10 scale with the most common low-grade pattern being a Chelo 6.  I discussed the staging workup including a total body bone scan and CT scan especially with a PSA greater than 10.  We discussed the various options at length. I discussed a radical retropubic prostatectomy done in the traditional fashion and using the robotic technique.  I then discussed radiation treatment both seed therapy and external beam therapy using Gold fiduciary markers.  We talked about some of the other alternatives such as a cryosurgical ablation at high intensity focused ultrasound as being viable alternatives but not recommended at this time.  He focused on aggressive watchful waiting and explained that currently low literature it's been discovered that a lot of men can actually observe it especially with numerous other comorbidities cannot have problems from the cancer by itself.  Talked about hormonal ablation and the side effects of creation of insulin resistance.  Overall, the patient was given appropriate literature, is going to think about it, and I'm going to revisit the topic with them after the next office visit.  For CyberKnife had placement of fiduciary markers today            This document has been electronically signed by ALTHEA RESENDEZ MD May 14, 2024 08:40 EDT    Dictated Utilizing Dragon Dictation: Part of this note may be an electronic transcription/translation of spoken language to printed text using the Dragon Dictation System.

## 2024-05-17 ENCOUNTER — APPOINTMENT (OUTPATIENT)
Dept: RADIATION ONCOLOGY | Facility: HOSPITAL | Age: 74
End: 2024-05-17
Payer: MEDICARE

## 2024-05-20 ENCOUNTER — HOSPITAL ENCOUNTER (OUTPATIENT)
Dept: RADIATION ONCOLOGY | Facility: HOSPITAL | Age: 74
Setting detail: RADIATION/ONCOLOGY SERIES
Discharge: HOME | End: 2024-05-20
Payer: MEDICARE

## 2024-05-20 ENCOUNTER — OFFICE VISIT (OUTPATIENT)
Dept: RADIATION ONCOLOGY | Facility: HOSPITAL | Age: 74
End: 2024-05-20
Payer: MEDICARE

## 2024-05-20 DIAGNOSIS — C61 PROSTATE CANCER: Primary | ICD-10-CM

## 2024-05-20 RX ORDER — TAMSULOSIN HYDROCHLORIDE 0.4 MG/1
1 CAPSULE ORAL NIGHTLY
Qty: 30 CAPSULE | Refills: 11 | Status: SHIPPED | OUTPATIENT
Start: 2024-05-20

## 2024-05-20 NOTE — PROGRESS NOTES
TELEMEDICINE RE-EVALUATION    PATIENT:                                                      Tip Palacios  :                                                          1950  DATE:                          2024   DIAGNOSIS:     Prostate cancer  - Stage IIC (cT1c, cN0, cM0, PSA: 18.4, Grade Group: 3)    This visit has been converted to a telehealth virtual visit, the patient's preferred method for today's re-evaluation appointment.  Total time of discussion was 18 minutes.  The patient has given verbal consent.     BRIEF HISTORY:  The patient is a very pleasant 73 y.o. male with history of significant BPH and urinary outflow obstructive symptoms, now with more recent diagnosis of unfavorable intermediate risk prostate cancer.  He initiated androgen ablation with LHRH agonist 6-month injection on 2024.  PSA has decreased to a value of 2.25 ng/ml on 2024.  He notes some increased hot flashes and fatigue but otherwise is tolerating androgen ablation well.  He is now interested in definitive treatment with a course of CyberKnife stereotactic body radiotherapy.  He underwent placement of gold seed fiducials performed by Dr. Miur on 2024 without difficulty or change in voiding.  Denies gross hematuria.  Denies dysuria.  He has had no other change in health since informed consent was obtained on 2024 and he remains a candidate for SBRT.  He has begun the necessary dietary modifications in preparation for planning CT and MRI which will be performed tomorrow.      No Known Allergies    Review of Systems   Constitutional:  Positive for fatigue.   Endocrine: Positive for hot flashes.   Genitourinary:  Positive for nocturia.    Musculoskeletal:  Positive for arthralgias, back pain and neck pain.   Neurological:  Positive for dizziness, headaches and light-headedness.   Psychiatric/Behavioral:  Positive for sleep disturbance.    All other systems reviewed and are negative.          Objective            KPS 80%    Physical Exam  Pulmonary:      Respirations even, unlabored. No audible wheezing or cough.  Neurological:      A+Ox4, conversant, answers questions appropriately.  Psychiatric:     Judgement, affect, and decision-making WNL.    Limited physical exam as visit was conducted remotely via telephone.        The following portions of the patient's history were reviewed and updated as appropriate: allergies, current medications, past family history, past medical history, past social history, past surgical history, and problem list.    Diagnoses and all orders for this visit:    Prostate cancer      IMPRESSION:  Prostate cancer, Chelo's 4+3=7, clinical stage IIC (T1c, M0, M0), PSA 18.4 ng/ml.  He has unfavorable intermediate risk disease.  He has already initiated treatment with androgen ablation and has had significant decrease in PSA to value 2.25 ng/ml indicating he has at least partially hormone sensitive disease.  He voices understanding of activity restrictions and necessary diet and bowel prep.    Prophylatic alpha blocker to begin 2 days prior to treatment.  Referral to  was offered to discuss resources such as [employer] but the patient is not currently interested.         RECOMMENDATIONS:  Return 5/21/2024 for CT simulation and MRI pelvis for planning.  The prostate gland and proximal seminal vesicles will receive 5 fractions of 7 Gray each, delivered daily on the CyberKnife with break over the weekend.         SETH Porter  A total of 29 minutes was spent on this encounter, with 18 minutes in virtual communication with the patient via telephone, and the remainder of the time spent in reviewing the relevant history, records, available imaging, and for documentation.

## 2024-05-21 ENCOUNTER — HOSPITAL ENCOUNTER (OUTPATIENT)
Dept: RADIATION ONCOLOGY | Facility: HOSPITAL | Age: 74
Setting detail: RADIATION/ONCOLOGY SERIES
Discharge: HOME | End: 2024-05-21
Payer: MEDICARE

## 2024-05-21 ENCOUNTER — HOSPITAL ENCOUNTER (OUTPATIENT)
Dept: MRI IMAGING | Facility: HOSPITAL | Age: 74
Discharge: HOME | End: 2024-05-21
Admitting: RADIOLOGY
Payer: MEDICARE

## 2024-05-21 DIAGNOSIS — C61 PROSTATE CANCER: ICD-10-CM

## 2024-05-21 PROCEDURE — 72195 MRI PELVIS W/O DYE: CPT

## 2024-05-21 PROCEDURE — 77399 UNLISTED PX MED RADJ PHYSICS: CPT | Performed by: RADIOLOGY

## 2024-05-31 ENCOUNTER — TELEPHONE (OUTPATIENT)
Dept: RADIATION ONCOLOGY | Facility: HOSPITAL | Age: 74
End: 2024-05-31
Payer: MEDICARE

## 2024-05-31 PROCEDURE — 77300 RADIATION THERAPY DOSE PLAN: CPT | Performed by: RADIOLOGY

## 2024-05-31 PROCEDURE — 77301 RADIOTHERAPY DOSE PLAN IMRT: CPT | Performed by: RADIOLOGY

## 2024-05-31 PROCEDURE — 77338 DESIGN MLC DEVICE FOR IMRT: CPT | Performed by: RADIOLOGY

## 2024-05-31 NOTE — TELEPHONE ENCOUNTER
Received a call from pt's daughter, Maria Teresa Saleem, stating that pt's wife got a call stating pt's insurance denied coverage for cyberknife.   Called to check with Kathy Briones, she states there is a note stating he has medicare part B and [facility] and we need to know which insurance to file.   I explained that we do not have a referral from the [facility], only one from Dr. Muir's office.  Provided Lisandra Jett RN, number and explained she does insurance approvals for our department.  Also provided Raine Roe's number at the [facility].  Maria Teresa states they will probably want to file insurance with the [facility].  She will call back once the proper arrangements have been made.  All information relayed to Kathy Briones.

## 2024-06-03 ENCOUNTER — HOSPITAL ENCOUNTER (OUTPATIENT)
Dept: RADIATION ONCOLOGY | Facility: HOSPITAL | Age: 74
Setting detail: RADIATION/ONCOLOGY SERIES
End: 2024-06-03
Payer: OTHER GOVERNMENT

## 2024-06-04 ENCOUNTER — TELEPHONE (OUTPATIENT)
Dept: UROLOGY | Facility: CLINIC | Age: 74
End: 2024-06-04

## 2024-06-04 NOTE — TELEPHONE ENCOUNTER
Provider: DR RESENDEZ    Caller: ANSELMO    Relationship to Patient: [facility]    Phone Number: [phone], EXT [phone]    Reason for Call: ANSELMO WAS CALLING TO VERIFY PT APPTS. SHE HAS A NEW [facility] AUTH NUMBER FOR PT APPT ON 5-14-24    [id number]  DATES- 5-14-24 - 11-10-24    PT HAS MEDICARE LISTED IN HIS CHART, PLEASE MAKE SURE PT APPTS ARE NOT BILLED TO MEDICARE.    PLEASE FAX OFFICE NOTES 5-14-24 TO [phone]

## 2024-06-10 ENCOUNTER — DOCUMENTATION (OUTPATIENT)
Dept: OTHER | Facility: HOSPITAL | Age: 74
End: 2024-06-10
Payer: MEDICARE

## 2024-06-10 NOTE — PROGRESS NOTES
SW received pt's radiation dates for treatment. Pt will be receiving treatment 6/12-6/18. SW sent [facility] referral for above dates. [facility] is able to accommodate pt 6/13-6/18, therefore SW requested hotel through [facility] for night of 6/12. SW relayed all information to pt's step daughter per request. ABHAY will email hotel confirmation to [email or web address]     Interventions:  Practical Needs: Lodging Assistance ([place] [facility] 6/13-6/18, hotel for night of 6/12)

## 2024-06-12 ENCOUNTER — HOSPITAL ENCOUNTER (OUTPATIENT)
Dept: RADIATION ONCOLOGY | Facility: HOSPITAL | Age: 74
Setting detail: RADIATION/ONCOLOGY SERIES
Discharge: HOME | End: 2024-06-12
Payer: OTHER GOVERNMENT

## 2024-06-12 PROCEDURE — 77373 STRTCTC BDY RAD THER TX DLVR: CPT | Performed by: RADIOLOGY

## 2024-06-13 ENCOUNTER — HOSPITAL ENCOUNTER (OUTPATIENT)
Dept: RADIATION ONCOLOGY | Facility: HOSPITAL | Age: 74
Setting detail: RADIATION/ONCOLOGY SERIES
Discharge: HOME | End: 2024-06-13
Payer: OTHER GOVERNMENT

## 2024-06-13 PROCEDURE — 77373 STRTCTC BDY RAD THER TX DLVR: CPT | Performed by: RADIOLOGY

## 2024-06-14 ENCOUNTER — HOSPITAL ENCOUNTER (OUTPATIENT)
Dept: RADIATION ONCOLOGY | Facility: HOSPITAL | Age: 74
Setting detail: RADIATION/ONCOLOGY SERIES
Discharge: HOME | End: 2024-06-14
Payer: OTHER GOVERNMENT

## 2024-06-14 PROCEDURE — 77373 STRTCTC BDY RAD THER TX DLVR: CPT | Performed by: RADIOLOGY

## 2024-06-17 ENCOUNTER — HOSPITAL ENCOUNTER (OUTPATIENT)
Dept: RADIATION ONCOLOGY | Facility: HOSPITAL | Age: 74
Setting detail: RADIATION/ONCOLOGY SERIES
Discharge: HOME | End: 2024-06-17
Payer: OTHER GOVERNMENT

## 2024-06-17 PROCEDURE — 77373 STRTCTC BDY RAD THER TX DLVR: CPT | Performed by: RADIOLOGY

## 2024-06-18 ENCOUNTER — HOSPITAL ENCOUNTER (OUTPATIENT)
Dept: RADIATION ONCOLOGY | Facility: HOSPITAL | Age: 74
Setting detail: RADIATION/ONCOLOGY SERIES
Discharge: HOME | End: 2024-06-18
Payer: OTHER GOVERNMENT

## 2024-06-18 PROCEDURE — 77336 RADIATION PHYSICS CONSULT: CPT | Performed by: RADIOLOGY

## 2024-06-18 PROCEDURE — 77373 STRTCTC BDY RAD THER TX DLVR: CPT | Performed by: RADIOLOGY

## 2024-07-03 ENCOUNTER — TELEPHONE (OUTPATIENT)
Dept: RADIATION ONCOLOGY | Facility: HOSPITAL | Age: 74
End: 2024-07-03
Payer: MEDICARE

## 2024-07-03 RX ORDER — METHYLPREDNISOLONE 4 MG/1
TABLET ORAL
Qty: 21 TABLET | Refills: 0 | Status: SHIPPED | OUTPATIENT
Start: 2024-07-03

## 2024-07-03 NOTE — TELEPHONE ENCOUNTER
Pt called stating he has been up all night going to the bathroom every 5-15 mins.  Pt c/o of burning, stinging, low urine stream, straining to get urine out and diarrhea.  Pt states he has been taking his tamsulosin and AZO since completing radiation.  Pt states he does see a little blood on the stool but denies any mucous.  I explained I would discuss with Dr. Kelsey and call pt back.  Pt verbalized understanding.

## 2024-07-16 ENCOUNTER — HOSPITAL ENCOUNTER (OUTPATIENT)
Dept: RADIATION ONCOLOGY | Facility: HOSPITAL | Age: 74
Setting detail: RADIATION/ONCOLOGY SERIES
Discharge: HOME | End: 2024-07-16
Payer: OTHER GOVERNMENT

## 2024-07-16 ENCOUNTER — OFFICE VISIT (OUTPATIENT)
Dept: RADIATION ONCOLOGY | Facility: HOSPITAL | Age: 74
End: 2024-07-16
Payer: OTHER GOVERNMENT

## 2024-07-16 DIAGNOSIS — C61 PROSTATE CANCER: Primary | ICD-10-CM

## 2024-07-16 NOTE — PROGRESS NOTES
FOLLOW UP NOTE    PATIENT:                                                      Tip Palacios  MEDICAL RECORD #:                        [id number]  :                                                          1950  COMPLETION DATE:   2024  DIAGNOSIS:     Prostate cancer  - Stage IIC (cT1c, cN0, cM0, PSA: 18.4, Grade Group: 3)    This visit has been converted to a telehealth virtual visit, the patient's preferred method for today's follow-up.  Total time of discussion was 12 minutes.  The patient has given verbal consent.    BRIEF HISTORY:    Tip Palacios is a 73-year-old male with a history of significant BPH and urinary outflow obstructive symptoms, now with more recent diagnosis of unfavorable intermediate risk prostate cancer.  He initiated androgen ablation with LHRH agonist 6-month injection on 2024.  PSA has decreased to a value of 2.25 ng/ml on 2024.  He noted some increased hot flashes and fatigue but otherwise is tolerating androgen ablation well.  He underwent treatment with a course of CyberKnife stereotactic body radiotherapy, completing on 2024.  The patient tolerated treatment well.  He experienced urinary outflow obstructive symptoms.  Patient reports he would be doing very well if he did not continue to experience burning with urination, nocturia, and increased urinary frequency.  He denies fevers, chills, pelvic pain.  He continues to take tamsulosin nightly.  He does report his symptoms are starting to improve and is experiencing less dysuria and decreasing urinary frequency.  He notes mild fatigue.  No bowel issues.  Denies new aches or pains.    MEDICATIONS: Medication reconciliation for the patient was reviewed and confirmed in the electronic medical record.    Review of Systems:   Review of Systems   Constitutional:  Positive for fatigue. Negative for chills and fever.   Gastrointestinal: Negative.    Genitourinary:  Positive for difficulty urinating, dysuria,  frequency and nocturia. Negative for bladder incontinence, hematuria and pelvic pain.    Musculoskeletal: Negative.    All other systems reviewed and are negative.      Physical Exam:   Pulmonary:      Respirations even, unlabored. No audible wheezing or cough.  Neurological:      A+Ox4, conversant, answers questions appropriately.  Psychiatric:     Judgement, affect, and decision-making WNL.    Limited physical exam as visit was conducted remotely via telephone.    VITAL SIGNS: N/A- Telehealth              KPS %:  80    The following portions of the patient's history were reviewed and updated as appropriate: allergies, current medications, past family history, past medical history, past social history, past surgical history and problem list.            IMPRESSION:   Prostate cancer, [place]'s 4+3=7, clinical stage IIC (T1c, M0, M0), PSA 18.4 ng/ml.  He has unfavorable intermediate risk disease.  He has already initiated treatment with androgen ablation and has had significant decrease in PSA to value 2.25 ng/ml indicating he has at least partially hormone sensitive disease.  He underwent definitive treatment with CyberKnife stereotactic body radiotherapy, completing approximately 4 weeks ago.  He overall tolerated treatment well, however his baseline urinary obstructive symptoms are exacerbated.  He reports his symptoms are now starting to gradually improve.  We reviewed concerning symptoms to watch for such as fever/chills, pelvic pain, and increasing dysuria which would indicate a UTI and he would need to take antibiotics.  Patient reports his dysuria is actually improving so we will hold off at this time.  We discussed trialing Cystex to improve his dysuria and increasing his Flomax to twice daily for a brief time to improve his urinary frequency.  Patient verbalizes understanding.  He will contact our office in the next 1 to 2 weeks if his side effects do not continue to improve.  He plans to continue follow-up  with Dr. Muir.    Mr. Palacios and I have reviewed the survivorship care plan in detail.  We discussed diagnosis, follow-up intervals, biannual PSA monitoring, and expectations for response to treatment, including typical timeline for PSA to hopefully reach target shirley value of <0.5 ng/mL.  A copy of the care plan has been mailed to the patient.  A copy has also been sent to his PCP.     RECOMMENDATIONS:  He plans to continue urology surveillance with biannual PSA testing under the care of Dr. Muir.  A follow-up office visit and PSA test scheduled with Dr. Puente for 11/18/2024.  I will see him back again next year, but would be happy to see him sooner, if needed.    Return in about 6 months (around 1/20/2025) for Office Visit       25 minutes in virtual communication with the patient via telephone, and the remainder of the time spent in reviewing the relevant history, records, available imaging, and for documentation..             SETH Squires    Errors in dictation may reflect use of voice recognition software and not all errors in transcription may have been detected prior to signing.

## 2024-10-25 ENCOUNTER — TELEPHONE (OUTPATIENT)
Dept: RADIATION ONCOLOGY | Facility: HOSPITAL | Age: 74
End: 2024-10-25
Payer: MEDICARE

## 2024-10-25 NOTE — TELEPHONE ENCOUNTER
Attempted to return call to patient regarding burning stinging with urination, and excoriated felice area, called both numbers on file, Dr. Kelsey notified. Left VM to contact urologist and use OTC Lotrimin to relieve skin rash.  Instructed pt to call back with further questions

## 2024-10-25 NOTE — TELEPHONE ENCOUNTER
"Returned patient's call after multiple messages were left on Lillian's phone.  Lillian is at the [place] location today.  Patient completed [facility] Prostate radiation treatments on 6/18/24 and was seen in the Clinic by Amada Huerta on 7/16/24.  Patient reports having reddened and excoriated skin in perineum that started several weeks ago,but \"comes and goes\".  Patient reports that 2 days ago the reddened worsened to the point that he has difficulty walking as it will chafe.  Patient is keeping the area clean and dry, but is not putting any creams or lotions on the area.  Patient reports that it is not draining and there are not open areas.  Patient reports that he also has burning and stinging when he urinates that started several days ago and he reports that this also \"comes and goes\".  Patient is concerned that this is related to radiation treatments.  I explained that side effects from [facility] Radiation Treatments take place for 2-3 weeks after treatments are completed, but would not develop 4 months after completion of treatments.  I encouraged patient to call PCP and make appointment as soon as possible.  I also encouraged patient to keep skin clean and dry and may use Desitin or Aquaphor directly on skin several times daily being careful not to get cream/ointment in anus.  Encouraged patient to utilize OTC AZO, Cystex, or Uristat for relief of urinary burning and stinging.  Patient may also go to a local [facility] to be seen if not able to get into PCP in a timely manner.  Patient asked that I update Dr. Kelsey.  Patient has contact information and is aware that Dr. Kelsey is on vacation next week, but will call Monday to see if he may be seen in the Clinic by our APRN should he feel that he needs to be seen in Radiation/Oncology.  Patient promised to call PCP immediately and is agreeable to recommendations and current plan.  "

## 2024-10-30 ENCOUNTER — OFFICE VISIT (OUTPATIENT)
Dept: UROLOGY | Facility: CLINIC | Age: 74
End: 2024-10-30
Payer: OTHER GOVERNMENT

## 2024-10-30 VITALS
SYSTOLIC BLOOD PRESSURE: 146 MMHG | DIASTOLIC BLOOD PRESSURE: 81 MMHG | HEART RATE: 101 BPM | WEIGHT: 187 LBS | HEIGHT: 72 IN | BODY MASS INDEX: 25.33 KG/M2

## 2024-10-30 DIAGNOSIS — C61 PROSTATE CANCER: ICD-10-CM

## 2024-10-30 DIAGNOSIS — R35.0 FREQUENCY OF MICTURITION: Primary | ICD-10-CM

## 2024-10-30 DIAGNOSIS — B37.2 YEAST DERMATITIS: ICD-10-CM

## 2024-10-30 DIAGNOSIS — N32.81 OVERACTIVE BLADDER: ICD-10-CM

## 2024-10-30 LAB
BILIRUB BLD-MCNC: ABNORMAL MG/DL
CLARITY, POC: CLEAR
COLOR UR: YELLOW
EXPIRATION DATE: ABNORMAL
GLUCOSE UR STRIP-MCNC: NEGATIVE MG/DL
KETONES UR QL: NEGATIVE
LEUKOCYTE EST, POC: NEGATIVE
Lab: ABNORMAL
NITRITE UR-MCNC: NEGATIVE MG/ML
PH UR: 6 [PH] (ref 5–8)
PROT UR STRIP-MCNC: NEGATIVE MG/DL
RBC # UR STRIP: ABNORMAL /UL
SP GR UR: 1.03 (ref 1–1.03)
UROBILINOGEN UR QL: NORMAL

## 2024-10-30 RX ORDER — NYSTATIN 100000 U/G
1 OINTMENT TOPICAL EVERY 12 HOURS PRN
Qty: 30 G | Refills: 1 | Status: SHIPPED | OUTPATIENT
Start: 2024-10-30

## 2024-10-30 RX ORDER — NYSTATIN 100000 [USP'U]/G
POWDER TOPICAL EVERY 12 HOURS
Qty: 60 G | Refills: 1 | Status: SHIPPED | OUTPATIENT
Start: 2024-10-30

## 2024-10-30 NOTE — PROGRESS NOTES
"Chief Complaint:    Chief Complaint   Patient presents with    Prostate Cancer       Vital Signs:   /81 (BP Location: Right arm, Patient Position: Sitting)   Pulse 101   Ht 182.9 cm (72.01\")   Wt 84.8 kg (187 lb)   BMI 25.36 kg/m²   Body mass index is 25.36 kg/m².      HPI:  Tip Palacios is a 73 y.o. male who presents today for follow up    History of Present Illness  Mr. Palacios presents to the clinic for follow-up for prostate carcinoma and concerns of infection.  He was initially seen by me in office and had an elevated PSA at greater than 18 and proceeded forward with a biopsy in office that came back positive for prostate adenocarcinoma.  He was referred to Dr. Kelsey in [facility] and underwent MRI of CyberKnife in May and June of this year.  States that he has had intermittent issues since undergoing treatment.  He reports burning, itching, and redness in the scrotum, inguinal canals, and perineum area.  He states this will occur for a few days and then improved.  He also endorses dysuria and intermittent difficulty with urination with weak stream.  He has been taking Flomax as needed.  He does report getting up 5-6 times throughout the night.  He did have a repeat PSA in April of this year that showed a significant decrease to 2.75.  He denies any fever, chills, gross hematuria, or sensations of incomplete emptying.  Prostate Cancer  Associated symptoms include chills, coughing, fatigue and nausea. Pertinent negatives include no abdominal pain, chest pain, fever, headaches, joint swelling, rash or vomiting.       Past Medical History:  Past Medical History:   Diagnosis Date    Benign prostatic hyperplasia     Hyperlipidemia     Hypertension     Prostate cancer     Prostatic enlargement     Skin cancer     Urinary incontinence     Urinary tract infection        Current Meds:  Current Outpatient Medications   Medication Sig Dispense Refill    ciprofloxacin (Cipro) 500 MG tablet Take one " tablet twice a day before procedure 4 tablet 0    diazePAM (VALIUM) 10 MG tablet One tablet night before procedure at bedtime and one morning of procedure 2 tablet 0    diphenoxylate-atropine (LOMOTIL) 2.5-0.025 MG per tablet Take 1 tablet by mouth 4 (Four) Times a Day As Needed for Diarrhea. 6 tablet 0    finasteride (PROSCAR) 5 MG tablet Take 1 tablet by mouth Daily. 30 tablet 5    gabapentin (NEURONTIN) 300 MG capsule Take 1 capsule by mouth 2 (Two) Times a Day.      HYDROcodone-acetaminophen (Norco)  MG per tablet Take 1 tablet by mouth Every 6 (Six) Hours As Needed for Moderate Pain. 20 tablet 0    methylPREDNISolone (MEDROL) 4 MG dose pack Take as directed on package instructions. 21 tablet 0    phenazopyridine (Pyridium) 200 MG tablet Take 1 tablet by mouth 3 (Three) Times a Day As Needed for Bladder Spasms. 20 tablet 0    pravastatin (PRAVACHOL) 40 MG tablet Take 1 tablet by mouth Daily.      Sodium Phosphates ([employer] Enema Disposable) 19-7 GM/118ML enema Use morning of the procedure 1 enema 0    tamsulosin (FLOMAX) 0.4 MG capsule 24 hr capsule Take 1 capsule by mouth Every Night.      tamsulosin (FLOMAX) 0.4 MG capsule 24 hr capsule Take 1 capsule by mouth Every Night. To begin 2 days prior to radiation treatments. 30 capsule 11    traZODone (DESYREL) 50 MG tablet Take 1 tablet by mouth Every Night.      nystatin (MYCOSTATIN) 525765 UNIT/GM ointment Apply 1 Application topically to the appropriate area as directed Every 12 (Twelve) Hours As Needed (itching and burning). 30 g 1    nystatin (MYCOSTATIN) 056542 UNIT/GM powder Apply  topically to the appropriate area as directed Every 12 (Twelve) Hours. 60 g 1    Vibegron 75 MG tablet Take 1 tablet by mouth Every Night. 21 tablet 0     No current facility-administered medications for this visit.        Allergies:   No Known Allergies     Past Surgical History:  Past Surgical History:   Procedure Laterality Date    BACK SURGERY      HERNIA REPAIR  2014     PROSTATE BIOPSY  12/21/2023       Social History:  Social History     Socioeconomic History    Marital status:    Tobacco Use    Smoking status: Never    Smokeless tobacco: Never   Vaping Use    Vaping status: Never Used   Substance and Sexual Activity    Alcohol use: Yes     Comment: occasional    Drug use: No    Sexual activity: Never       Family History:  Family History   Problem Relation Age of Onset    Liver disease Mother     Heart disease Father        Review of Systems:  Review of Systems   Constitutional:  Positive for chills and fatigue. Negative for fever and unexpected weight change.   Respiratory:  Positive for cough. Negative for chest tightness, shortness of breath and wheezing.    Cardiovascular:  Negative for chest pain and leg swelling.   Gastrointestinal:  Positive for nausea. Negative for abdominal pain, constipation, diarrhea and vomiting.   Genitourinary:  Positive for difficulty urinating, dysuria, frequency and urgency. Negative for penile swelling, scrotal swelling and testicular pain.   Musculoskeletal:  Negative for back pain and joint swelling.   Skin:  Negative for rash.   Neurological:  Negative for dizziness and headaches.   Psychiatric/Behavioral:  Negative for confusion and suicidal ideas.        Physical Exam:  Physical Exam  Constitutional:       General: He is not in acute distress.     Appearance: Normal appearance.   HENT:      Head: Normocephalic and atraumatic.      Nose: Nose normal.      Mouth/Throat:      Mouth: Mucous membranes are moist.   Eyes:      Conjunctiva/sclera: Conjunctivae normal.   Cardiovascular:      Rate and Rhythm: Normal rate and regular rhythm.      Pulses: Normal pulses.      Heart sounds: Normal heart sounds.   Pulmonary:      Effort: Pulmonary effort is normal.      Breath sounds: Normal breath sounds.   Abdominal:      General: Bowel sounds are normal.      Palpations: Abdomen is soft.   Genitourinary:     Comments: Notable yeast dermatitis of  the scrotum and inguinal folds.  Uncircumcised penis with easily retractable foreskin and no evidence of balanitis.  Normal left and right testicles with some slight atrophy.  Musculoskeletal:         General: Normal range of motion.      Cervical back: Normal range of motion.   Skin:     General: Skin is warm.   Neurological:      General: No focal deficit present.      Mental Status: He is alert and oriented to person, place, and time.   Psychiatric:         Mood and Affect: Mood normal.         Behavior: Behavior normal.         Thought Content: Thought content normal.         Judgment: Judgment normal.              Recent Image (CT and/or KUB):   CT Abdomen and Pelvis: Results for orders placed during the hospital encounter of 01/12/24    CT Abdomen Pelvis With & Without Contrast    Narrative  PROCEDURE: CT ABDOMEN PELVIS W WO CONTRAST-    HISTORY: Prostate cancer, high risk, staging; P33-Ecrcmduky neoplasm of  prostate    COMPARISON: 11/14/2023.    TECHNIQUE: Multiple axial CT images were obtained from the lung bases  through the pubic symphysis before and following the administration of  Isovue-300. . This study was performed with techniques to keep radiation  doses as low as reasonably achievable (ALARA). Individualized dose  reduction techniques using automated exposure control or adjustment of  mA and/or kV according to the patient size were employed.    FINDINGS:    ABDOMEN: The lung bases are clear are clear. The heart is normal in  size. The liver is normal. The spleen is unremarkable. No adrenal mass  is present. There is prominence of the pancreatic duct in the head and  neck of the pancreas unchanged compared to the prior exam. No focal  pancreatic lesions are identified. Precontrast images reveal no evidence  of nephrolithiasis. The kidneys enhance appropriately. There is no  evidence of a cystic or solid renal mass. The aorta is normal in  caliber. The mesenteric vasculature is patent. There is no  free fluid or  adenopathy. The small bowel is unremarkable with no evidence of  obstruction.    PELVIS: The appendix is normal. There are colonic diverticula without  evidence of diverticulitis. The prostate gland is enlarged. The urinary  bladder is unremarkable. There is no significant free fluid or  adenopathy. Bone windows reveal no lytic or destructive lesions. There  are degenerative changes within the lumbar spine with grade 1  anterolisthesis of L4 on L5.    .    Impression  No evidence of metastatic disease within the abdomen or  pelvis.      This report was finalized on 1/12/2024 1:49 PM by Jewels Kennedy M.D..     CT Stone Protocol: No results found for this or any previous visit.     KUB: No results found for this or any previous visit.       Labs:  Brief Urine Lab Results  (Last result in the past 365 days)        Color   Clarity   Blood   Leuk Est   Nitrite   Protein   CREAT   Urine HCG        10/30/24 1307 Yellow   Clear   1+   Negative   Negative   Negative                 Office Visit on 10/30/2024   Component Date Value Ref Range Status    Color 10/30/2024 Yellow  Yellow, Straw, Dark Yellow, Sandra Final    Clarity, UA 10/30/2024 Clear  Clear Final    Specific Gravity  10/30/2024 1.030  1.005 - 1.030 Final    pH, Urine 10/30/2024 6.0  5.0 - 8.0 Final    Leukocytes 10/30/2024 Negative  Negative Final    Nitrite, UA 10/30/2024 Negative  Negative Final    Protein, POC 10/30/2024 Negative  Negative mg/dL Final    Glucose, UA 10/30/2024 Negative  Negative mg/dL Final    Ketones, UA 10/30/2024 Negative  Negative Final    Urobilinogen, UA 10/30/2024 Normal  Normal, 0.2 E.U./dL Final    Bilirubin 10/30/2024 1 mg/dL (A)  Negative Final    Blood, UA 10/30/2024 1+ (A)  Negative Final    Lot Number 10/30/2024 [id number]   Final    Expiration Date 10/30/2024 102,024   Final        Procedure: None  Procedures     I have reviewed and agree with the above PMH, PSH, FMH, social history, medications,  allergies, and labs.     Assessment/Plan:   Problem List Items Addressed This Visit       Prostate cancer     Other Visit Diagnoses       Frequency of micturition    -  Primary    Relevant Orders    POC Urinalysis Dipstick, Automated (Completed)    Yeast dermatitis        Relevant Medications    nystatin (MYCOSTATIN) 967127 UNIT/GM powder    nystatin (MYCOSTATIN) 287851 UNIT/GM ointment    Overactive bladder        Relevant Medications    Vibegron 75 MG tablet            Health Maintenance:   Health Maintenance Due   Topic Date Due    LIPID PANEL  Never done    COLORECTAL CANCER SCREENING  Never done    COVID-19 Vaccine (1) Never done    Pneumococcal Vaccine 65+ (1 of 2 - PCV) Never done    TDAP/TD VACCINES (1 - Tdap) Never done    ZOSTER VACCINE (1 of 2) Never done    HEPATITIS C SCREENING  Never done    ANNUAL WELLNESS VISIT  Never done    RSV Vaccine - Adults (1 - 1-dose 75+ series) Never done    INFLUENZA VACCINE  Never done        Smoking Counseling: Never smoked or used smokeless tobacco    Urine Incontinence: Patient reports that he is not currently experiencing any symptoms of urinary incontinence.    Patient was given instructions and counseling regarding his condition or for health maintenance advice. Please see specific information pulled into the AVS if appropriate.    Patient Education:   Prostate carcinoma -patient is status post CyberKnife treatments by Dr. Kelsey in [facility] with overall decrease in his PSA to a 2.7.  He has not had a PSA since.  Did recommend routine screening with PSA testing every 6 to 12 months for the first 2 years post radiation treatment.  Did advise patient that arise and elevated PSA could mean clinically significant return of prostate cancer.  Did discuss repeat workup including CT scans as well as nuclear medicine bone studies.  Patient did wish to hold off on repeat PSA in office today and we will recheck 1 in 1 month.  Patient verbalized understanding.  Yeast  dermatitis  -discussed with the patient the pathophysiology of this condition in detail.  He does have notable yeast dermatitis within the inguinal canals and scrotal area.  I am recommending to clean area with warm soap and water twice daily and apply dry appropriately.  Advised him to start with nystatin powders and if there is no significant improvement to transition to ointment twice daily as needed.  Discussed the risks of these medications in detail with the patient.  Educated patient to call with any questions or concerns otherwise we will place him back in a month.  He verbalized understanding.  Overactive bladder/nocturia -discussed with the patient the pathophysiology of this condition in detail.  Discussed forms of treatment which can include medications surgical interventions.  Advised patient to continue with Flomax 0.4 mg 1 hour prior to bedtime.  I will also add on Gemtesa 75 mg once nightly.  Discussed the risk and benefits of these medications in detail with patient advised him to discontinue for begins experiencing increasing difficulty urinating, decreased urinary output, or inability urinate.  Otherwise we will place him back in a month for repeat evaluation.  Patient verbalized understanding.    Visit Diagnoses:    ICD-10-CM ICD-9-CM   1. Frequency of micturition  R35.0 788.41   2. Prostate cancer  C61 185   3. Yeast dermatitis  B37.2 112.3   4. Overactive bladder  N32.81 596.51     A total of 30 minutes were spent coordinating this patient’s care in clinic today; 20 minutes of which were face-to-face with the patient, reviewing medical history and counseling on the current treatment and followup plan.  All questions were answered to patient's satisfaction.    Meds Ordered During Visit:  New Medications Ordered This Visit   Medications    nystatin (MYCOSTATIN) 768931 UNIT/GM powder     Sig: Apply  topically to the appropriate area as directed Every 12 (Twelve) Hours.     Dispense:  60 g      Refill:  1    nystatin (MYCOSTATIN) 675113 UNIT/GM ointment     Sig: Apply 1 Application topically to the appropriate area as directed Every 12 (Twelve) Hours As Needed (itching and burning).     Dispense:  30 g     Refill:  1    Vibegron 75 MG tablet     Sig: Take 1 tablet by mouth Every Night.     Dispense:  21 tablet     Refill:  0       Follow Up Appointment: 1 month  No follow-ups on file.      This document has been electronically signed by Yohannes Mcgowan PA-C   October 30, 2024 13:21 EDT    Part of this note may be an electronic transcription/translation of spoken language to printed text using the Dragon Dictation System.

## 2024-12-04 ENCOUNTER — OFFICE VISIT (OUTPATIENT)
Dept: UROLOGY | Facility: CLINIC | Age: 74
End: 2024-12-04
Payer: OTHER GOVERNMENT

## 2024-12-04 VITALS
DIASTOLIC BLOOD PRESSURE: 69 MMHG | WEIGHT: 181.6 LBS | BODY MASS INDEX: 24.6 KG/M2 | HEART RATE: 86 BPM | SYSTOLIC BLOOD PRESSURE: 115 MMHG | HEIGHT: 72 IN

## 2024-12-04 DIAGNOSIS — R35.0 FREQUENCY OF MICTURITION: Primary | ICD-10-CM

## 2024-12-04 DIAGNOSIS — N32.81 OVERACTIVE BLADDER: ICD-10-CM

## 2024-12-04 DIAGNOSIS — C61 PROSTATE CANCER: ICD-10-CM

## 2024-12-04 DIAGNOSIS — M54.40 ACUTE LOW BACK PAIN WITH SCIATICA, SCIATICA LATERALITY UNSPECIFIED, UNSPECIFIED BACK PAIN LATERALITY: ICD-10-CM

## 2024-12-04 LAB
BILIRUB BLD-MCNC: ABNORMAL MG/DL
CLARITY, POC: CLEAR
COLOR UR: YELLOW
EXPIRATION DATE: ABNORMAL
GLUCOSE UR STRIP-MCNC: NEGATIVE MG/DL
KETONES UR QL: NEGATIVE
LEUKOCYTE EST, POC: NEGATIVE
Lab: ABNORMAL
NITRITE UR-MCNC: NEGATIVE MG/ML
PH UR: 5 [PH] (ref 5–8)
PROT UR STRIP-MCNC: ABNORMAL MG/DL
RBC # UR STRIP: NEGATIVE /UL
SP GR UR: 1.03 (ref 1–1.03)
UROBILINOGEN UR QL: NORMAL

## 2024-12-04 PROCEDURE — 84153 ASSAY OF PSA TOTAL: CPT

## 2024-12-04 PROCEDURE — 87086 URINE CULTURE/COLONY COUNT: CPT

## 2024-12-04 RX ORDER — TRAMADOL HYDROCHLORIDE 50 MG/1
50 TABLET ORAL EVERY 6 HOURS PRN
Qty: 16 TABLET | Refills: 0 | Status: SHIPPED | OUTPATIENT
Start: 2024-12-04

## 2024-12-04 NOTE — PROGRESS NOTES
"Chief Complaint:    Chief Complaint   Patient presents with    Frequency of micturition       Vital Signs:   /69   Pulse 86   Ht 182.9 cm (72.01\")   Wt 82.4 kg (181 lb 9.6 oz)   BMI 24.62 kg/m²   Body mass index is 24.62 kg/m².      HPI:  Tip Palacios is a 73 y.o. male who presents today for follow up    History of Present Illness  Mr. Palacios presents to the clinic for follow-up for 1 month follow-up.  He was initially seen by me in office and had an elevated PSA at greater than 18 and proceeded forward with a biopsy in office that came back positive for prostate adenocarcinoma.  He was referred to Dr. Kelsey in [facility] and underwent MRI of CyberKnife in May and June of this year.  States that he has had intermittent issues since undergoing treatment.  At last office visit the patient had significant yeast dermatitis within the inguinal canals and around the scrotum.  He was started on nystatin ointment and powder as needed with resolution of previous symptoms.  He was also given a sample of Gemtesa at last office visit and has had improvement in lower urinary tract symptoms and wishes to continue with medications.  He does endorse recent back injury for which he is having significant pain with radiation into the lower legs.  He reports his pain is all low back and he believes he may have pulled a muscle.  He rates this as an 8 out of 10.  It is improving with ice and over-the-counter medications.  His last PSA on file had decreased significantly to 2.7 in April.  Prostate Cancer  Associated symptoms include chills, coughing, fatigue and nausea. Pertinent negatives include no abdominal pain, chest pain, fever, headaches, joint swelling, rash or vomiting.       Past Medical History:  Past Medical History:   Diagnosis Date    Benign prostatic hyperplasia     Cancer     Elevated PSA     Hyperlipidemia     Hypertension     Prostate cancer     Prostatic enlargement     Skin cancer     Urinary " incontinence     Urinary tract infection        Current Meds:  Current Outpatient Medications   Medication Sig Dispense Refill    ciprofloxacin (Cipro) 500 MG tablet Take one tablet twice a day before procedure 4 tablet 0    diazePAM (VALIUM) 10 MG tablet One tablet night before procedure at bedtime and one morning of procedure 2 tablet 0    diphenoxylate-atropine (LOMOTIL) 2.5-0.025 MG per tablet Take 1 tablet by mouth 4 (Four) Times a Day As Needed for Diarrhea. 6 tablet 0    gabapentin (NEURONTIN) 300 MG capsule Take 1 capsule by mouth 2 (Two) Times a Day.      methylPREDNISolone (MEDROL) 4 MG dose pack Take as directed on package instructions. 21 tablet 0    nystatin (MYCOSTATIN) 804824 UNIT/GM ointment Apply 1 Application topically to the appropriate area as directed Every 12 (Twelve) Hours As Needed (itching and burning). 30 g 1    nystatin (MYCOSTATIN) 064191 UNIT/GM powder Apply  topically to the appropriate area as directed Every 12 (Twelve) Hours. 60 g 1    phenazopyridine (Pyridium) 200 MG tablet Take 1 tablet by mouth 3 (Three) Times a Day As Needed for Bladder Spasms. 20 tablet 0    pravastatin (PRAVACHOL) 40 MG tablet Take 1 tablet by mouth Daily.      Sodium Phosphates ([employer] Enema Disposable) 19-7 GM/118ML enema Use morning of the procedure 1 enema 0    tamsulosin (FLOMAX) 0.4 MG capsule 24 hr capsule Take 1 capsule by mouth Every Night.      tamsulosin (FLOMAX) 0.4 MG capsule 24 hr capsule Take 1 capsule by mouth Every Night. To begin 2 days prior to radiation treatments. 30 capsule 11    traZODone (DESYREL) 50 MG tablet Take 1 tablet by mouth Every Night.      Vibegron 75 MG tablet Take 1 tablet by mouth Every Night. 90 tablet 3    traMADol (ULTRAM) 50 MG tablet Take 1 tablet by mouth Every 6 (Six) Hours As Needed for Severe Pain or Moderate Pain. 16 tablet 0     No current facility-administered medications for this visit.        Allergies:   No Known Allergies     Past Surgical History:  Past  Surgical History:   Procedure Laterality Date    BACK SURGERY      HERNIA REPAIR  2014    PROSTATE BIOPSY  12/21/2023       Social History:  Social History     Socioeconomic History    Marital status:    Tobacco Use    Smoking status: Never    Smokeless tobacco: Never   Vaping Use    Vaping status: Never Used   Substance and Sexual Activity    Alcohol use: Yes     Comment: occasional    Drug use: No    Sexual activity: Never       Family History:  Family History   Problem Relation Age of Onset    Liver disease Mother     Heart disease Father        Review of Systems:  Review of Systems   Constitutional:  Positive for chills and fatigue. Negative for fever and unexpected weight change.   Respiratory:  Positive for cough. Negative for chest tightness, shortness of breath and wheezing.    Cardiovascular:  Negative for chest pain and leg swelling.   Gastrointestinal:  Positive for nausea. Negative for abdominal pain, constipation, diarrhea and vomiting.   Genitourinary:  Positive for difficulty urinating, dysuria, frequency and urgency. Negative for penile swelling, scrotal swelling and testicular pain.   Musculoskeletal:  Positive for back pain. Negative for joint swelling.   Skin:  Negative for rash.   Neurological:  Negative for dizziness and headaches.   Psychiatric/Behavioral:  Negative for confusion and suicidal ideas.        Physical Exam:  Physical Exam  Constitutional:       General: He is not in acute distress.     Appearance: Normal appearance.   HENT:      Head: Normocephalic and atraumatic.      Nose: Nose normal.      Mouth/Throat:      Mouth: Mucous membranes are moist.   Eyes:      Conjunctiva/sclera: Conjunctivae normal.   Cardiovascular:      Rate and Rhythm: Normal rate and regular rhythm.      Pulses: Normal pulses.      Heart sounds: Normal heart sounds.   Pulmonary:      Effort: Pulmonary effort is normal.      Breath sounds: Normal breath sounds.   Abdominal:      General: Bowel sounds are  normal.      Palpations: Abdomen is soft.   Genitourinary:     Comments: Notable yeast dermatitis of the scrotum and inguinal folds.  Uncircumcised penis with easily retractable foreskin and no evidence of balanitis.  Normal left and right testicles with some slight atrophy.  Musculoskeletal:         General: Normal range of motion.      Cervical back: Normal range of motion.   Skin:     General: Skin is warm.   Neurological:      General: No focal deficit present.      Mental Status: He is alert and oriented to person, place, and time.   Psychiatric:         Mood and Affect: Mood normal.         Behavior: Behavior normal.         Thought Content: Thought content normal.         Judgment: Judgment normal.              Recent Image (CT and/or KUB):   CT Abdomen and Pelvis: Results for orders placed during the hospital encounter of 01/12/24    CT Abdomen Pelvis With & Without Contrast    Narrative  PROCEDURE: CT ABDOMEN PELVIS W WO CONTRAST-    HISTORY: Prostate cancer, high risk, staging; V69-Irfrqentp neoplasm of  prostate    COMPARISON: 11/14/2023.    TECHNIQUE: Multiple axial CT images were obtained from the lung bases  through the pubic symphysis before and following the administration of  Isovue-300. . This study was performed with techniques to keep radiation  doses as low as reasonably achievable (ALARA). Individualized dose  reduction techniques using automated exposure control or adjustment of  mA and/or kV according to the patient size were employed.    FINDINGS:    ABDOMEN: The lung bases are clear are clear. The heart is normal in  size. The liver is normal. The spleen is unremarkable. No adrenal mass  is present. There is prominence of the pancreatic duct in the head and  neck of the pancreas unchanged compared to the prior exam. No focal  pancreatic lesions are identified. Precontrast images reveal no evidence  of nephrolithiasis. The kidneys enhance appropriately. There is no  evidence of a cystic or  solid renal mass. The aorta is normal in  caliber. The mesenteric vasculature is patent. There is no free fluid or  adenopathy. The small bowel is unremarkable with no evidence of  obstruction.    PELVIS: The appendix is normal. There are colonic diverticula without  evidence of diverticulitis. The prostate gland is enlarged. The urinary  bladder is unremarkable. There is no significant free fluid or  adenopathy. Bone windows reveal no lytic or destructive lesions. There  are degenerative changes within the lumbar spine with grade 1  anterolisthesis of L4 on L5.    .    Impression  No evidence of metastatic disease within the abdomen or  pelvis.      This report was finalized on 1/12/2024 1:49 PM by Jewels Kennedy M.D..     CT Stone Protocol: No results found for this or any previous visit.     KUB: No results found for this or any previous visit.       Labs:  Brief Urine Lab Results  (Last result in the past 365 days)        Color   Clarity   Blood   Leuk Est   Nitrite   Protein   CREAT   Urine HCG        12/04/24 1326 Yellow   Clear   Negative   Negative   Negative   1+                 Office Visit on 12/04/2024   Component Date Value Ref Range Status    Color 12/04/2024 Yellow  Yellow, Straw, Dark Yellow, Sandra Final    Clarity, UA 12/04/2024 Clear  Clear Final    Specific Gravity  12/04/2024 1.030  1.005 - 1.030 Final    pH, Urine 12/04/2024 5.0  5.0 - 8.0 Final    Leukocytes 12/04/2024 Negative  Negative Final    Nitrite, UA 12/04/2024 Negative  Negative Final    Protein, POC 12/04/2024 1+ (A)  Negative mg/dL Final    Glucose, UA 12/04/2024 Negative  Negative mg/dL Final    Ketones, UA 12/04/2024 Negative  Negative Final    Urobilinogen, UA 12/04/2024 Normal  Normal, 0.2 E.U./dL Final    Bilirubin 12/04/2024 Small (1+) (A)  Negative Final    Blood, UA 12/04/2024 Negative  Negative Final    Lot Number 12/04/2024 [id number]   Final    Expiration Date 12/04/2024 5/1/2026   Final   Office Visit on  10/30/2024   Component Date Value Ref Range Status    Color 10/30/2024 Yellow  Yellow, Straw, Dark Yellow, Sandra Final    Clarity, UA 10/30/2024 Clear  Clear Final    Specific Gravity  10/30/2024 1.030  1.005 - 1.030 Final    pH, Urine 10/30/2024 6.0  5.0 - 8.0 Final    Leukocytes 10/30/2024 Negative  Negative Final    Nitrite, UA 10/30/2024 Negative  Negative Final    Protein, POC 10/30/2024 Negative  Negative mg/dL Final    Glucose, UA 10/30/2024 Negative  Negative mg/dL Final    Ketones, UA 10/30/2024 Negative  Negative Final    Urobilinogen, UA 10/30/2024 Normal  Normal, 0.2 E.U./dL Final    Bilirubin 10/30/2024 1 mg/dL (A)  Negative Final    Blood, UA 10/30/2024 1+ (A)  Negative Final    Lot Number 10/30/2024 [id number]   Final    Expiration Date 10/30/2024 102,024   Final        Procedure: None  Procedures     I have reviewed and agree with the above PMH, PSH, FMH, social history, medications, allergies, and labs.     Assessment/Plan:   Problem List Items Addressed This Visit       Prostate cancer    Relevant Medications    traMADol (ULTRAM) 50 MG tablet    Other Relevant Orders    PSA Diagnostic    Overactive bladder    Relevant Medications    Vibegron 75 MG tablet     Other Visit Diagnoses       Frequency of micturition    -  Primary    Relevant Orders    POC Urinalysis Dipstick, Automated (Completed)    Urine Culture - Urine, Urine, Random Void    Acute low back pain with sciatica, sciatica laterality unspecified, unspecified back pain laterality        Relevant Medications    traMADol (ULTRAM) 50 MG tablet              Health Maintenance:   Health Maintenance Due   Topic Date Due    LIPID PANEL  Never done    COLORECTAL CANCER SCREENING  Never done    COVID-19 Vaccine (1) Never done    Pneumococcal Vaccine 65+ (1 of 2 - PCV) Never done    TDAP/TD VACCINES (1 - Tdap) Never done    ZOSTER VACCINE (1 of 2) Never done    HEPATITIS C SCREENING  Never done    ANNUAL WELLNESS VISIT  Never done    RSV Vaccine  - Adults (1 - 1-dose 75+ series) Never done    INFLUENZA VACCINE  Never done        Smoking Counseling: Never smoked or used smokeless tobacco    Urine Incontinence: Patient reports that he is not currently experiencing any symptoms of urinary incontinence.    Patient was given instructions and counseling regarding his condition or for health maintenance advice. Please see specific information pulled into the AVS if appropriate.    Patient Education:   Prostate carcinoma -patient is status post CyberKnife treatments by Dr. Kelsey in [facility] with overall decrease in his PSA to a 2.7.  He has not had a PSA since.  Did recommend routine screening with PSA testing every 6 to 12 months for the first 2 years post radiation treatment.  Did advise patient that arise and elevated PSA could mean clinically significant return of prostate cancer.  Did discuss repeat workup including CT scans as well as nuclear medicine bone studies.  Will repeat a PSA in office today and call patient with results once available.  Did recommend repeat PSA testing in 6 months to ensure stability.  He verbalized understanding.  Yeast dermatitis  -patient's symptoms have resolved since last office visit and will continue with observation and appropriate hygiene.  He verbalized understanding.  Overactive bladder/nocturia -patient has been doing well on Gemtesa 75 mg once nightly and will continue with medications.  I will send in a prescription to his local pharmacy.    Low back pain/sciatica  -patient was having significant low back pain in office today and I will send in a short prescription of tramadol 50 mg to take as needed every 6 hours.  Discussed the risk of these medications in detail with the patient.  Vies patient to follow-up with his primary care provider symptoms do not improve within the next 2 weeks.  Recommend to continue with ice and over-the-counter medications as needed.  Educated to return to clinic sooner if needed  or to call with any questions or concerns otherwise we will see him back in 6 months pending PSA.  He verbalized understanding.    Visit Diagnoses:    ICD-10-CM ICD-9-CM   1. Frequency of micturition  R35.0 788.41   2. Prostate cancer  C61 185   3. Acute low back pain with sciatica, sciatica laterality unspecified, unspecified back pain laterality  M54.40 724.2     724.3   4. Overactive bladder  N32.81 596.51       A total of 25 minutes were spent coordinating this patient’s care in clinic today; 15 minutes of which were face-to-face with the patient, reviewing medical history and counseling on the current treatment and followup plan.  All questions were answered to patient's satisfaction.    Meds Ordered During Visit:  New Medications Ordered This Visit   Medications    Vibegron 75 MG tablet     Sig: Take 1 tablet by mouth Every Night.     Dispense:  90 tablet     Refill:  3    traMADol (ULTRAM) 50 MG tablet     Sig: Take 1 tablet by mouth Every 6 (Six) Hours As Needed for Severe Pain or Moderate Pain.     Dispense:  16 tablet     Refill:  0       Follow Up Appointment: 6 months  No follow-ups on file.      This document has been electronically signed by Yohannes Mcgowan PA-C   December 4, 2024 13:42 EST    Part of this note may be an electronic transcription/translation of spoken language to printed text using the Dragon Dictation System.

## 2024-12-05 ENCOUNTER — TELEPHONE (OUTPATIENT)
Dept: UROLOGY | Facility: CLINIC | Age: 74
End: 2024-12-05
Payer: MEDICARE

## 2024-12-05 DIAGNOSIS — M54.40 ACUTE LOW BACK PAIN WITH SCIATICA, SCIATICA LATERALITY UNSPECIFIED, UNSPECIFIED BACK PAIN LATERALITY: Primary | ICD-10-CM

## 2024-12-05 LAB — PSA SERPL-MCNC: <0.014 NG/ML (ref 0–4)

## 2024-12-05 RX ORDER — CYCLOBENZAPRINE HCL 10 MG
10 TABLET ORAL EVERY 8 HOURS PRN
Qty: 30 TABLET | Refills: 1 | Status: SHIPPED | OUTPATIENT
Start: 2024-12-05

## 2024-12-05 NOTE — TELEPHONE ENCOUNTER
Called patient advised him PSA was well within normal limits and it decreased further to less than 0.014.  He is requesting some muscle relaxers and I will send in Flexeril.

## 2024-12-06 ENCOUNTER — TELEPHONE (OUTPATIENT)
Dept: UROLOGY | Facility: CLINIC | Age: 74
End: 2024-12-06
Payer: MEDICARE

## 2024-12-06 LAB — BACTERIA SPEC AEROBE CULT: NO GROWTH

## 2024-12-06 NOTE — TELEPHONE ENCOUNTER
----- Message from Yohannes Mcgowan sent at 12/6/2024  3:03 PM EST -----  Please let patient know his urine culture was negative.  Have him keep his follow-up in 6 months for repeat PSA.  Thank you  ----- Message -----  From: Viry Underwood CMA  Sent: 12/4/2024   1:26 PM EST  To: Yohannes Mcgowan PA-C

## 2025-02-13 ENCOUNTER — HOSPITAL ENCOUNTER (OUTPATIENT)
Dept: RADIATION ONCOLOGY | Facility: HOSPITAL | Age: 75
Setting detail: RADIATION/ONCOLOGY SERIES
Discharge: HOME | End: 2025-02-13
Payer: OTHER GOVERNMENT

## 2025-02-13 ENCOUNTER — OFFICE VISIT (OUTPATIENT)
Dept: RADIATION ONCOLOGY | Facility: HOSPITAL | Age: 75
End: 2025-02-13
Payer: MEDICARE

## 2025-02-13 VITALS
RESPIRATION RATE: 18 BRPM | BODY MASS INDEX: 24.42 KG/M2 | SYSTOLIC BLOOD PRESSURE: 157 MMHG | HEART RATE: 69 BPM | WEIGHT: 180.3 LBS | TEMPERATURE: 97.5 F | OXYGEN SATURATION: 92 % | HEIGHT: 72 IN | DIASTOLIC BLOOD PRESSURE: 81 MMHG

## 2025-02-13 DIAGNOSIS — C61 PROSTATE CANCER: Primary | ICD-10-CM

## 2025-02-13 PROCEDURE — G0463 HOSPITAL OUTPT CLINIC VISIT: HCPCS

## 2025-02-13 RX ORDER — ALFUZOSIN HYDROCHLORIDE 10 MG/1
10 TABLET, EXTENDED RELEASE ORAL NIGHTLY
Qty: 30 TABLET | Refills: 3 | Status: SHIPPED | OUTPATIENT
Start: 2025-02-13

## 2025-02-13 NOTE — PROGRESS NOTES
FOLLOW UP NOTE    PATIENT:                                                      Tip Palacios  MEDICAL RECORD #:                        [id number]  :                                                          1950  COMPLETION DATE:   2024  DIAGNOSIS:     Prostate cancer  - Stage IIC (cT1c, cN0, cM0, PSA: 18.4, Grade Group: 3)        BRIEF HISTORY:    Tip Palacios is a 74-year-old male with a history of significant BPH and urinary outflow obstructive symptoms, now with more recent diagnosis of unfavorable intermediate risk prostate cancer.  He initiated androgen ablation with LHRH agonist 6-month injection on 2024.  PSA has decreased to a value of 2.25 ng/ml on 2024.  He noted some increased hot flashes and fatigue but otherwise is tolerating androgen ablation well.  He underwent treatment with a course of CyberKnife stereotactic body radiotherapy, completing on 2024.  The patient tolerated treatment well.  He experienced urinary outflow obstructive symptoms.  Patient reports his urinary function has improved since his last visit.  He discontinued Flomax approximately 2 to 3 weeks ago since he did not see much benefit.  He reports urinary urgency, incomplete emptying, frequency, urgency, nocturia x3 and occasional dysuria.  Urology prescribes Gemtesa.  Reports intermittent mild straining.  He denies bladder incontinence, hematuria, and pelvic pain.  Denies new aches or pains.  Has chronic back pain in which has tramadol for..    IPSS Questionnaire (AUA-7):  Over the past month…    1)  Incomplete Emptying  How often have you had a sensation of not emptying your bladder?  3 - About half the time   2)  Frequency  How often have you had to urinate less than every two hours? 4 - More than half the time   3)  Intermittency  How often have you found you stopped and started again several times when you urinated?  3 - About half the time   4) Urgency  How often have you found it difficult to  postpone urination?  4 - More than half the time   5) Weak Stream  How often have you had a weak urinary stream?  2 - Less than half the time   6) Straining  How often have you had to push or strain to begin urination?  2 - Less than half the time   7) Nocturia  How many times did you typically get up at night to urinate?  3 - 3 times   Total Score:  21       Quality of life due to urinary symptoms:  If you were to spend the rest of your life with your urinary condition the way it is now, how would you feel about that? 4-Mostly Dissatisfied   Urine Leakage (Incontinence) 0-No Leakage     Sexual Health Inventory  Current Status    1)  How do you rate your confidence that you could achieve and keep an erection? 1-Very Low   2) When you had erections with sexual stimulation, how often were your erections hard enough for penetration (entering your partner)? 1-Almost never or never   3)  During sexual intercourse, how often were you able to maintain your erection after you had penetrated (entered) into your partner? 2-A few times (much less than half the time)   4) During sexual intercourse, how difficult was it to maintain your erection to completion of intercourse? 3-Difficult   5) When you attempted sexual intercourse, how often was it satisfactory to you? 5-Almost always or always   Total Score: 12       Bowel Health Inventory  Current Status: 0-No problems, no rectal bleeding, no discharge, less then 5 bowel movements a day         MEDICATIONS: Medication reconciliation for the patient was reviewed and confirmed in the electronic medical record.    Review of Systems:   Review of Systems   Constitutional:  Positive for fatigue.   Gastrointestinal: Negative.    Genitourinary:  Positive for difficulty urinating, dysuria, frequency and nocturia. Negative for bladder incontinence and pelvic pain.         Urinary urgency   Musculoskeletal:  Positive for back pain.        Chronic back pain, has tramadol  Joint stiffness  "  All other systems reviewed and are negative.      Physical Exam:   Physical Exam  Constitutional:       Appearance: Normal appearance.   HENT:      Mouth/Throat:      Mouth: Mucous membranes are moist.   Cardiovascular:      Rate and Rhythm: Normal rate.   Pulmonary:      Effort: Pulmonary effort is normal.   Abdominal:      General: Abdomen is flat.   Genitourinary:     Comments: Enlarged (Symmetrical enlarged, elongated with smooth surface no nodularity or induration, estimate 50 cc volume.  Seminal vesicles are nonpalpable.).  Musculoskeletal:         General: Normal range of motion.      Cervical back: Normal range of motion.   Skin:     General: Skin is warm.   Neurological:      General: No focal deficit present.      Mental Status: He is alert and oriented to person, place, and time.   Psychiatric:         Mood and Affect: Mood normal.         Behavior: Behavior normal.         VITAL SIGNS:   Vitals:    02/13/25 1259   BP: 157/81   Pulse: 69   Resp: 18   Temp: 97.5 °F (36.4 °C)   TempSrc: Skin   SpO2: 92%   Weight: 81.8 kg (180 lb 4.8 oz)   Height: 182.9 cm (72\")   PainSc:   8   PainLoc: Back  Comment: chronic, [facility]                 KPS %:  80    The following portions of the patient's history were reviewed and updated as appropriate: allergies, current medications, past family history, past medical history, past social history, past surgical history and problem list.               Component  Ref Range & Units 2 mo ago  (12/4/24) 10 mo ago  (4/8/24) 1 yr ago  (1/30/24) 1 yr ago  (10/27/23) 11 yr ago  (2/6/14)   PSA  0.000 - 4.000 ng/mL <0.014 2.750 18.400 High               IMPRESSION:   Prostate cancer, Chelo's 4+3=7, clinical stage IIC (T1c, M0, M0), PSA 18.4 ng/ml.  He has unfavorable intermediate risk disease.  He received androgen ablation 2/2024 and has had significant decrease in PSA to value 2.25 ng/ml indicating he has at least partially hormone sensitive disease.  He underwent " definitive treatment with CyberKnife stereotactic body radiotherapy, completing approximately 8 months ago.  He overall tolerated treatment well, however his baseline urinary obstructive symptoms are exacerbated.  He reports his symptoms are still slowly improving. However patient is dissatisfied with current urinary function.  We discussed adding alfuzosin nightly to hopefully give him some improvement. We also reviewed concerning symptoms to watch for such as fever/chills, pelvic pain, and increasing dysuria which would indicate a UTI and he would need to take antibiotics.      We reviewed diagnosis, follow-up intervals, biannual PSA monitoring, and expectations for response to treatment, including typical timeline for PSA to hopefully reach target shirley value of <0.5 ng/mL.  A copy of the care plan has been mailed to the patient.  A copy has also been sent to his PCP.      RECOMMENDATIONS:   He plans to continue urology surveillance with biannual PSA testing under the care of Dr. Muir.  A follow-up office visit and PSA test scheduled with Dr. Muir for 6/4/2025.  I will see him back again next year, but would be happy to see him sooner, if needed.     Return in about 12 months (around 2/13/2026) for Office Visit       I spent a total of 38 minutes on todays visit, with more than 20 minutes in direct face to face communication, and the remainder of the time spent in reviewing the relevant history, records, available imaging, and for documentation.             SETH Squires    Errors in dictation may reflect use of voice recognition software and not all errors in transcription may have been detected prior to signing.

## 2025-04-07 ENCOUNTER — TELEPHONE (OUTPATIENT)
Dept: UROLOGY | Facility: CLINIC | Age: 75
End: 2025-04-07
Payer: MEDICARE

## 2025-04-07 NOTE — TELEPHONE ENCOUNTER
Routing to Reji- he said that the pt usually brings forms to fill out. Routing to Romina to call the pt and ask him that whoever needs this fax over forms or requests of what is needed.

## 2025-04-07 NOTE — TELEPHONE ENCOUNTER
Patient would like a letter helping him qualify for disability for agent orange.  Please contact him when this letter is ready for pick.

## 2025-04-08 NOTE — TELEPHONE ENCOUNTER
Patient also stated that he had an appointment on 4/18/25 with a doctor concerning his disability for agent orange.

## 2025-04-10 ENCOUNTER — TELEPHONE (OUTPATIENT)
Dept: UROLOGY | Facility: CLINIC | Age: 75
End: 2025-04-10
Payer: MEDICARE

## 2025-04-10 NOTE — TELEPHONE ENCOUNTER
Left a  message letting patient know that Dr. Muir would not be able to write him a letter stating that the exposure to agent orange cause his cancer but he could come  his records.

## 2025-06-04 ENCOUNTER — OFFICE VISIT (OUTPATIENT)
Dept: UROLOGY | Facility: CLINIC | Age: 75
End: 2025-06-04
Payer: MEDICARE

## 2025-06-04 VITALS
BODY MASS INDEX: 22.08 KG/M2 | DIASTOLIC BLOOD PRESSURE: 80 MMHG | WEIGHT: 163 LBS | SYSTOLIC BLOOD PRESSURE: 131 MMHG | HEIGHT: 72 IN | HEART RATE: 99 BPM

## 2025-06-04 DIAGNOSIS — B37.2 YEAST DERMATITIS: ICD-10-CM

## 2025-06-04 DIAGNOSIS — R30.0 DYSURIA: ICD-10-CM

## 2025-06-04 DIAGNOSIS — M54.40 ACUTE LOW BACK PAIN WITH SCIATICA, SCIATICA LATERALITY UNSPECIFIED, UNSPECIFIED BACK PAIN LATERALITY: ICD-10-CM

## 2025-06-04 DIAGNOSIS — C61 PROSTATE CANCER: Primary | ICD-10-CM

## 2025-06-04 DIAGNOSIS — N52.01 ERECTILE DYSFUNCTION DUE TO ARTERIAL INSUFFICIENCY: ICD-10-CM

## 2025-06-04 LAB
BILIRUB BLD-MCNC: NEGATIVE MG/DL
CLARITY, POC: CLEAR
COLOR UR: YELLOW
EXPIRATION DATE: ABNORMAL
GLUCOSE UR STRIP-MCNC: NEGATIVE MG/DL
KETONES UR QL: NEGATIVE
LEUKOCYTE EST, POC: NEGATIVE
Lab: ABNORMAL
NITRITE UR-MCNC: NEGATIVE MG/ML
PH UR: 5.5 [PH] (ref 5–8)
PROT UR STRIP-MCNC: ABNORMAL MG/DL
RBC # UR STRIP: NEGATIVE /UL
SP GR UR: 1.03 (ref 1–1.03)
UROBILINOGEN UR QL: NORMAL

## 2025-06-04 PROCEDURE — 84153 ASSAY OF PSA TOTAL: CPT

## 2025-06-04 RX ORDER — PHENAZOPYRIDINE HYDROCHLORIDE 200 MG/1
200 TABLET, FILM COATED ORAL 3 TIMES DAILY PRN
Qty: 20 TABLET | Refills: 0 | Status: SHIPPED | OUTPATIENT
Start: 2025-06-04

## 2025-06-04 RX ORDER — TADALAFIL 20 MG/1
20 TABLET ORAL AS NEEDED
Qty: 20 TABLET | Refills: 3 | Status: SHIPPED | OUTPATIENT
Start: 2025-06-04

## 2025-06-04 RX ORDER — NYSTATIN 100000 U/G
1 OINTMENT TOPICAL EVERY 12 HOURS PRN
Qty: 30 G | Refills: 1 | Status: SHIPPED | OUTPATIENT
Start: 2025-06-04

## 2025-06-04 NOTE — PROGRESS NOTES
"Chief Complaint:    Chief Complaint   Patient presents with    Frequency of micturition     6 MONTH       Vital Signs:   /80 (BP Location: Left arm, Patient Position: Sitting)   Pulse 99   Ht 182.9 cm (72.01\")   Wt 73.9 kg (163 lb)   BMI 22.10 kg/m²   Body mass index is 22.1 kg/m².      HPI:  Tip Palacios is a 74 y.o. male who presents today for follow up    History of Present Illness  Mr. Watts returns to the clinic today for follow-up for prostatic carcinoma, dysuria, and yeast dermatitis.  Patient reports that since last office visit he has had improvement in his yeast but still uses nystatin cream as needed.  He also continue with alfuzosin nightly.  States that he was taking Gemtesa but has not been on the medication for a few months now.  Still complains of significant dysuria with urinating but denies any gross hematuria, fever, or chills.  He does get up 4 times throughout the night and has a weak urinary stream and has hesitancy with Frequency and sensations of incomplete emptying.  Last PSA in December 2024 was great at less than 0.014.  He also complains of erectile dysfunction and has been on tadalafil 20 mg with improvement.  He was requesting a refill of this medication as well.      Past Medical History:  Past Medical History:   Diagnosis Date    Benign prostatic hyperplasia     Cancer     Elevated PSA     Hyperlipidemia     Hypertension     Prostate cancer     Prostatic enlargement     Skin cancer     Urinary incontinence     Urinary tract infection        Current Meds:  Current Outpatient Medications   Medication Sig Dispense Refill    alfuzosin (UROXATRAL) 10 MG 24 hr tablet Take 1 tablet by mouth Every Night. 30 tablet 3    ciprofloxacin (Cipro) 500 MG tablet Take one tablet twice a day before procedure 4 tablet 0    cyclobenzaprine (FLEXERIL) 10 MG tablet Take 1 tablet by mouth Every 8 (Eight) Hours As Needed for Muscle Spasms. 30 tablet 1    diazePAM (VALIUM) 10 MG tablet One " tablet night before procedure at bedtime and one morning of procedure 2 tablet 0    diphenoxylate-atropine (LOMOTIL) 2.5-0.025 MG per tablet Take 1 tablet by mouth 4 (Four) Times a Day As Needed for Diarrhea. 6 tablet 0    gabapentin (NEURONTIN) 300 MG capsule Take 1 capsule by mouth 2 (Two) Times a Day.      methylPREDNISolone (MEDROL) 4 MG dose pack Take as directed on package instructions. 21 tablet 0    nystatin (MYCOSTATIN) 031752 UNIT/GM ointment Apply 1 Application topically to the appropriate area as directed Every 12 (Twelve) Hours As Needed (itching and burning). 30 g 1    phenazopyridine (Pyridium) 200 MG tablet Take 1 tablet by mouth 3 (Three) Times a Day As Needed for Bladder Spasms. 20 tablet 0    pravastatin (PRAVACHOL) 40 MG tablet Take 1 tablet by mouth Daily.      Sodium Phosphates ([employer] Enema Disposable) 19-7 GM/118ML enema Use morning of the procedure 1 enema 0    traMADol (ULTRAM) 50 MG tablet Take 1 tablet by mouth Every 6 (Six) Hours As Needed for Severe Pain or Moderate Pain. 16 tablet 0    traZODone (DESYREL) 50 MG tablet Take 1 tablet by mouth Every Night.      Vibegron 75 MG tablet Take 1 tablet by mouth Every Night. 90 tablet 3    tadalafil (Cialis) 20 MG tablet Take 1 tablet by mouth As Needed for Erectile Dysfunction. 20 tablet 3     No current facility-administered medications for this visit.        Allergies:   No Known Allergies     Past Surgical History:  Past Surgical History:   Procedure Laterality Date    BACK SURGERY      HERNIA REPAIR  2014    PROSTATE BIOPSY  12/21/2023       Social History:  Social History     Socioeconomic History    Marital status:    Tobacco Use    Smoking status: Never    Smokeless tobacco: Never   Vaping Use    Vaping status: Never Used   Substance and Sexual Activity    Alcohol use: Yes     Comment: occasional    Drug use: No    Sexual activity: Never       Family History:  Family History   Problem Relation Age of Onset    Liver disease Mother      Heart disease Father        Review of Systems:  Review of Systems   Constitutional:  Negative for chills, fatigue and fever.   Respiratory:  Negative for cough, shortness of breath and wheezing.    Cardiovascular:  Negative for leg swelling.   Gastrointestinal:  Positive for diarrhea and nausea. Negative for abdominal pain and vomiting.   Genitourinary:  Positive for dysuria, frequency and urgency. Negative for difficulty urinating, penile pain, penile swelling, scrotal swelling and testicular pain.   Musculoskeletal:  Positive for back pain. Negative for joint swelling.   Neurological:  Negative for dizziness and headaches.   Psychiatric/Behavioral:  Negative for confusion.        Physical Exam:  Physical Exam  Constitutional:       General: He is not in acute distress.     Appearance: Normal appearance.   HENT:      Head: Normocephalic and atraumatic.      Nose: Nose normal.      Mouth/Throat:      Mouth: Mucous membranes are moist.   Eyes:      Conjunctiva/sclera: Conjunctivae normal.   Cardiovascular:      Rate and Rhythm: Normal rate.      Pulses: Normal pulses.   Pulmonary:      Effort: Pulmonary effort is normal.   Abdominal:      Palpations: Abdomen is soft.   Musculoskeletal:         General: Normal range of motion.      Cervical back: Normal range of motion.   Skin:     General: Skin is warm.   Neurological:      General: No focal deficit present.      Mental Status: He is alert and oriented to person, place, and time.   Psychiatric:         Mood and Affect: Mood normal.         Behavior: Behavior normal.         Thought Content: Thought content normal.         Judgment: Judgment normal.         IPSS Questionnaire (AUA-7):  IPSS Questionnaire (AUA-7):                  IPSS Questionnaire (AUA-7):  Over the past month…    1)  Incomplete Emptying  How often have you had a sensation of not emptying your bladder?  5 - Almost always   2)  Frequency  How often have you had to urinate less than every two hours? 0  - Not at all   3)  Intermittency  How often have you found you stopped and started again several times when you urinated?  5 - Almost always   4) Urgency  How often have you found it difficult to postpone urination?  0 - Not at all   5) Weak Stream  How often have you had a weak urinary stream?  5 - Almost always   6) Straining  How often have you had to push or strain to begin urination?  5 - Almost always   7) Nocturia  How many times did you typically get up at night to urinate?  4 - 4 times   Total Score:  24   The International Prostate Symptom Score (IPSS) is used to screen, diagnose, track symptoms of benign prostatic hyperplasia (BPH).    0-7 pts (Mild Symptoms)  / 8-19 pts (Moderate) / 20-35 (Severe)    Quality of life due to urinary symptoms:  If you were to spend the rest of your life with your urinary condition the way it is now, how would you feel about that? 5-Unhappy   Urine Leakage (Incontinence) 0-No Leakage       Recent Image (CT and/or KUB):   CT Abdomen and Pelvis: Results for orders placed during the hospital encounter of 01/12/24    CT Abdomen Pelvis With & Without Contrast    Narrative  PROCEDURE: CT ABDOMEN PELVIS W WO CONTRAST-    HISTORY: Prostate cancer, high risk, staging; L65-Obmkbznuo neoplasm of  prostate    COMPARISON: 11/14/2023.    TECHNIQUE: Multiple axial CT images were obtained from the lung bases  through the pubic symphysis before and following the administration of  Isovue-300. . This study was performed with techniques to keep radiation  doses as low as reasonably achievable (ALARA). Individualized dose  reduction techniques using automated exposure control or adjustment of  mA and/or kV according to the patient size were employed.    FINDINGS:    ABDOMEN: The lung bases are clear are clear. The heart is normal in  size. The liver is normal. The spleen is unremarkable. No adrenal mass  is present. There is prominence of the pancreatic duct in the head and  neck of the  pancreas unchanged compared to the prior exam. No focal  pancreatic lesions are identified. Precontrast images reveal no evidence  of nephrolithiasis. The kidneys enhance appropriately. There is no  evidence of a cystic or solid renal mass. The aorta is normal in  caliber. The mesenteric vasculature is patent. There is no free fluid or  adenopathy. The small bowel is unremarkable with no evidence of  obstruction.    PELVIS: The appendix is normal. There are colonic diverticula without  evidence of diverticulitis. The prostate gland is enlarged. The urinary  bladder is unremarkable. There is no significant free fluid or  adenopathy. Bone windows reveal no lytic or destructive lesions. There  are degenerative changes within the lumbar spine with grade 1  anterolisthesis of L4 on L5.    .    Impression  No evidence of metastatic disease within the abdomen or  pelvis.      This report was finalized on 1/12/2024 1:49 PM by Jewels Kennedy M.D..     CT Stone Protocol: No results found for this or any previous visit.     KUB: No results found for this or any previous visit.       Labs:  Brief Urine Lab Results  (Last result in the past 365 days)        Color   Clarity   Blood   Leuk Est   Nitrite   Protein   CREAT   Urine HCG        06/04/25 1315 Yellow   Clear   Negative   Negative   Negative   1+                 Office Visit on 06/04/2025   Component Date Value Ref Range Status    Color 06/04/2025 Yellow  Yellow, Straw, Dark Yellow, Sandra Final    Clarity, UA 06/04/2025 Clear  Clear Final    Specific Gravity  06/04/2025 1.030  1.005 - 1.030 Final    pH, Urine 06/04/2025 5.5  5.0 - 8.0 Final    Leukocytes 06/04/2025 Negative  Negative Final    Nitrite, UA 06/04/2025 Negative  Negative Final    Protein, POC 06/04/2025 1+ (A)  Negative mg/dL Final    Glucose, UA 06/04/2025 Negative  Negative mg/dL Final    Ketones, UA 06/04/2025 Negative  Negative Final    Urobilinogen, UA 06/04/2025 Normal  Normal, 0.2 E.U./dL Final     Bilirubin 06/04/2025 Negative  Negative Final    Blood, UA 06/04/2025 Negative  Negative Final    Lot Number 06/04/2025 [id number]   Final    Expiration Date 06/04/2025 52,026   Final        Procedure: None  Procedures     I have reviewed and agree with the above PMH, PSH, FMH, social history, medications, allergies, and labs.     Assessment/Plan:   Problem List Items Addressed This Visit       Prostate cancer - Primary    Relevant Orders    PSA Diagnostic    Dysuria    Relevant Medications    phenazopyridine (Pyridium) 200 MG tablet    Yeast dermatitis    Relevant Medications    nystatin (MYCOSTATIN) 156716 UNIT/GM ointment    Erectile dysfunction due to arterial insufficiency    Relevant Medications    tadalafil (Cialis) 20 MG tablet     Other Visit Diagnoses         Acute low back pain with sciatica, sciatica laterality unspecified, unspecified back pain laterality        Relevant Orders    POC Urinalysis Dipstick, Automated (Completed)            Health Maintenance:   Health Maintenance Due   Topic Date Due    COVID-19 Vaccine (1) Never done    Pneumococcal Vaccine 50+ (1 of 2 - PCV) Never done    TDAP/TD VACCINES (1 - Tdap) Never done    ZOSTER VACCINE (2 of 2) 11/10/2020    HEPATITIS C SCREENING  Never done    ANNUAL WELLNESS VISIT  Never done    RSV Vaccine - Adults (1 - 1-dose 75+ series) Never done        Smoking Counseling: Never smoked.  Never used smokeless tobacco.  Counseling given.    Urine Incontinence: Patient reports that he is not currently experiencing any symptoms of urinary incontinence.    Patient was given instructions and counseling regarding his condition or for health maintenance advice. Please see specific information pulled into the AVS if appropriate.    Patient Education:   Prostatic carcinoma -patient is status post CyberKnife with radiation therapy treatment.  His recent PSA in December was great at less than 0.014.  I recommended repeat PSA in office today and we will call him  with blood test results once available.  Did advise any significant rise would be concerning for recurrence of prostate cancer and would recommend androgen deprivation therapy versus chemotherapy tablets such as bicalutamide/Xtandi.  Did discuss the nature of both of these medications as well as risks.  He verbalized understanding.  Erectile dysfunction -discussed with the patient the pathophysiology of erectile dysfunction.  I explained to the patient that erectile dysfunction is a symptom and no disorder.  I educated the patient that erectile dysfunction is often secondary to significant arterial insufficiency, diabetes mellitus, medications, trauma, low testosterone, or psychological factors.  I discussed with the patient ways to help treat erectile dysfunction which include but are not limited to medications, mechanical devices, penile injections, and penile implants.  Patient has been on tadalafil 20 mg on demand as needed and will continue with medications as prescribed.  I will refill it today in office.  Dysuria -patient's dysuria is most likely a side effect from MRI with CyberKnife therapy.  Given persistent symptoms I did discuss a cystoscopy for lower tract investigation to rule out urethral stricture versus chronic prostate issues.  Will get the patient scheduled for a cystoscopy in office on 6/20/2025 at 130.  Will send in a refill of Pyridium to take as needed.  Patient verbalized understanding.  Yeast dermatitis -patient has had improvement with nystatin creams and we will send in a refill to his local pharmacy.  Advised him to keep the area dry and clean area appropriately with warm soap and water twice daily.  He verbalized understanding.    Visit Diagnoses:    ICD-10-CM ICD-9-CM   1. Prostate cancer  C61 185   2. Dysuria  R30.0 788.1   3. Yeast dermatitis  B37.2 112.3   4. Erectile dysfunction due to arterial insufficiency  N52.01 607.84   5. Acute low back pain with sciatica, sciatica laterality  unspecified, unspecified back pain laterality  M54.40 724.2     724.3     A total of 30 minutes were spent coordinating this patient’s care in clinic today; 20 minutes of which were face-to-face with the patient, reviewing medical history and counseling on the current treatment and followup plan.  All questions were answered to patient's satisfaction.    Meds Ordered During Visit:  New Medications Ordered This Visit   Medications    phenazopyridine (Pyridium) 200 MG tablet     Sig: Take 1 tablet by mouth 3 (Three) Times a Day As Needed for Bladder Spasms.     Dispense:  20 tablet     Refill:  0    nystatin (MYCOSTATIN) 507570 UNIT/GM ointment     Sig: Apply 1 Application topically to the appropriate area as directed Every 12 (Twelve) Hours As Needed (itching and burning).     Dispense:  30 g     Refill:  1    tadalafil (Cialis) 20 MG tablet     Sig: Take 1 tablet by mouth As Needed for Erectile Dysfunction.     Dispense:  20 tablet     Refill:  3       Follow Up Appointment: Cystoscopy  No follow-ups on file.      This document has been electronically signed by Yohannes Mcgowan PA-C   June 4, 2025 14:01 EDT    Part of this note may be an electronic transcription/translation of spoken language to printed text using the Dragon Dictation System.

## 2025-06-05 ENCOUNTER — TRANSCRIBE ORDERS (OUTPATIENT)
Dept: ADMINISTRATIVE | Facility: HOSPITAL | Age: 75
End: 2025-06-05
Payer: MEDICARE

## 2025-06-05 DIAGNOSIS — M54.50 BACK PAIN, LUMBOSACRAL: Primary | ICD-10-CM

## 2025-06-05 LAB — PSA SERPL-MCNC: 0.06 NG/ML (ref 0–4)

## 2025-06-06 ENCOUNTER — RESULTS FOLLOW-UP (OUTPATIENT)
Dept: UROLOGY | Facility: CLINIC | Age: 75
End: 2025-06-06
Payer: MEDICARE

## 2025-06-06 ENCOUNTER — HOSPITAL ENCOUNTER (OUTPATIENT)
Dept: GENERAL RADIOLOGY | Facility: HOSPITAL | Age: 75
Discharge: HOME | End: 2025-06-06
Admitting: PHYSICIAN ASSISTANT
Payer: MEDICARE

## 2025-06-06 DIAGNOSIS — M54.50 BACK PAIN, LUMBOSACRAL: ICD-10-CM

## 2025-06-06 PROCEDURE — 72110 X-RAY EXAM L-2 SPINE 4/>VWS: CPT

## 2025-06-06 NOTE — TELEPHONE ENCOUNTER
Call patient advise PSA slightly increased to 0.057.  Recommended to keep follow-up for cystoscopy in and we may repeat PSA in 3 months to see if there is interval increase.  He verbalized understanding

## 2025-06-13 ENCOUNTER — HOSPITAL ENCOUNTER (OUTPATIENT)
Dept: MRI IMAGING | Facility: HOSPITAL | Age: 75
Discharge: HOME | End: 2025-06-13
Admitting: PHYSICIAN ASSISTANT
Payer: OTHER GOVERNMENT

## 2025-06-13 DIAGNOSIS — M54.50 BACK PAIN, LUMBOSACRAL: ICD-10-CM

## 2025-06-13 PROCEDURE — 25510000001 GADOPICLENOL 0.5 MMOL/ML SOLUTION: Performed by: PHYSICIAN ASSISTANT

## 2025-06-13 PROCEDURE — 72158 MRI LUMBAR SPINE W/O & W/DYE: CPT

## 2025-06-13 PROCEDURE — A9573 GADOPICLENOL 0.5 MMOL/ML SOLUTION: HCPCS | Performed by: PHYSICIAN ASSISTANT

## 2025-06-13 RX ADMIN — GADOPICLENOL 7 ML: 485.1 INJECTION INTRAVENOUS at 14:03

## 2025-06-19 DIAGNOSIS — C61 PROSTATE CANCER: ICD-10-CM

## 2025-06-19 LAB — CREAT BLDA-MCNC: 0.8 MG/DL (ref 0.6–1.3)

## 2025-06-19 RX ORDER — ALFUZOSIN HYDROCHLORIDE 10 MG/1
10 TABLET, EXTENDED RELEASE ORAL NIGHTLY
Qty: 30 TABLET | Refills: 3 | Status: SHIPPED | OUTPATIENT
Start: 2025-06-19

## 2025-07-16 ENCOUNTER — LAB (OUTPATIENT)
Dept: LAB | Facility: HOSPITAL | Age: 75
End: 2025-07-16
Payer: OTHER GOVERNMENT

## 2025-07-16 ENCOUNTER — TRANSCRIBE ORDERS (OUTPATIENT)
Dept: ADMINISTRATIVE | Facility: HOSPITAL | Age: 75
End: 2025-07-16
Payer: OTHER GOVERNMENT

## 2025-07-16 DIAGNOSIS — E78.5 HYPERLIPIDEMIA, UNSPECIFIED HYPERLIPIDEMIA TYPE: Primary | ICD-10-CM

## 2025-07-16 DIAGNOSIS — E78.5 HYPERLIPIDEMIA, UNSPECIFIED HYPERLIPIDEMIA TYPE: ICD-10-CM

## 2025-07-16 PROCEDURE — 84443 ASSAY THYROID STIM HORMONE: CPT

## 2025-07-16 PROCEDURE — 83036 HEMOGLOBIN GLYCOSYLATED A1C: CPT

## 2025-07-16 PROCEDURE — 80061 LIPID PANEL: CPT

## 2025-07-16 PROCEDURE — 36415 COLL VENOUS BLD VENIPUNCTURE: CPT

## 2025-07-16 PROCEDURE — 80053 COMPREHEN METABOLIC PANEL: CPT

## 2025-07-16 PROCEDURE — 85025 COMPLETE CBC W/AUTO DIFF WBC: CPT

## 2025-07-16 PROCEDURE — 82306 VITAMIN D 25 HYDROXY: CPT

## 2025-07-17 LAB
25(OH)D3 SERPL-MCNC: 28.7 NG/ML (ref 30–100)
ALBUMIN SERPL-MCNC: 4.5 G/DL (ref 3.5–5.2)
ALBUMIN/GLOB SERPL: 1.6 G/DL
ALP SERPL-CCNC: 104 U/L (ref 39–117)
ALT SERPL W P-5'-P-CCNC: 14 U/L (ref 1–41)
ANION GAP SERPL CALCULATED.3IONS-SCNC: 9.9 MMOL/L (ref 5–15)
AST SERPL-CCNC: 19 U/L (ref 1–40)
BASOPHILS # BLD AUTO: 0.06 10*3/MM3 (ref 0–0.2)
BASOPHILS NFR BLD AUTO: 0.9 % (ref 0–1.5)
BILIRUB SERPL-MCNC: 0.4 MG/DL (ref 0–1.2)
BUN SERPL-MCNC: 19 MG/DL (ref 8–23)
BUN/CREAT SERPL: 18.8 (ref 7–25)
CALCIUM SPEC-SCNC: 9.6 MG/DL (ref 8.6–10.5)
CHLORIDE SERPL-SCNC: 101 MMOL/L (ref 98–107)
CHOLEST SERPL-MCNC: 223 MG/DL (ref 0–200)
CO2 SERPL-SCNC: 28.1 MMOL/L (ref 22–29)
CREAT SERPL-MCNC: 1.01 MG/DL (ref 0.76–1.27)
DEPRECATED RDW RBC AUTO: 46.2 FL (ref 37–54)
EGFRCR SERPLBLD CKD-EPI 2021: 77.1 ML/MIN/1.73
EOSINOPHIL # BLD AUTO: 0.44 10*3/MM3 (ref 0–0.4)
EOSINOPHIL NFR BLD AUTO: 6.7 % (ref 0.3–6.2)
ERYTHROCYTE [DISTWIDTH] IN BLOOD BY AUTOMATED COUNT: 13.6 % (ref 12.3–15.4)
GLOBULIN UR ELPH-MCNC: 2.8 GM/DL
GLUCOSE SERPL-MCNC: 90 MG/DL (ref 65–99)
HBA1C MFR BLD: 5.6 % (ref 4.8–5.6)
HCT VFR BLD AUTO: 42.2 % (ref 37.5–51)
HDLC SERPL-MCNC: 52 MG/DL (ref 40–60)
HGB BLD-MCNC: 13.8 G/DL (ref 13–17.7)
IMM GRANULOCYTES # BLD AUTO: 0.01 10*3/MM3 (ref 0–0.05)
IMM GRANULOCYTES NFR BLD AUTO: 0.2 % (ref 0–0.5)
LDLC SERPL CALC-MCNC: 143 MG/DL (ref 0–100)
LDLC/HDLC SERPL: 2.69 {RATIO}
LYMPHOCYTES # BLD AUTO: 2 10*3/MM3 (ref 0.7–3.1)
LYMPHOCYTES NFR BLD AUTO: 30.6 % (ref 19.6–45.3)
MCH RBC QN AUTO: 30.5 PG (ref 26.6–33)
MCHC RBC AUTO-ENTMCNC: 32.7 G/DL (ref 31.5–35.7)
MCV RBC AUTO: 93.4 FL (ref 79–97)
MONOCYTES # BLD AUTO: 0.54 10*3/MM3 (ref 0.1–0.9)
MONOCYTES NFR BLD AUTO: 8.3 % (ref 5–12)
NEUTROPHILS NFR BLD AUTO: 3.49 10*3/MM3 (ref 1.7–7)
NEUTROPHILS NFR BLD AUTO: 53.3 % (ref 42.7–76)
NRBC BLD AUTO-RTO: 0 /100 WBC (ref 0–0.2)
PLATELET # BLD AUTO: 234 10*3/MM3 (ref 140–450)
PMV BLD AUTO: 10 FL (ref 6–12)
POTASSIUM SERPL-SCNC: 4.7 MMOL/L (ref 3.5–5.2)
PROT SERPL-MCNC: 7.3 G/DL (ref 6–8.5)
RBC # BLD AUTO: 4.52 10*6/MM3 (ref 4.14–5.8)
SODIUM SERPL-SCNC: 139 MMOL/L (ref 136–145)
TRIGL SERPL-MCNC: 155 MG/DL (ref 0–150)
TSH SERPL DL<=0.05 MIU/L-ACNC: 2.27 UIU/ML (ref 0.27–4.2)
VLDLC SERPL-MCNC: 28 MG/DL (ref 5–40)
WBC NRBC COR # BLD AUTO: 6.54 10*3/MM3 (ref 3.4–10.8)